# Patient Record
Sex: FEMALE | Race: WHITE | NOT HISPANIC OR LATINO | Employment: OTHER | URBAN - METROPOLITAN AREA
[De-identification: names, ages, dates, MRNs, and addresses within clinical notes are randomized per-mention and may not be internally consistent; named-entity substitution may affect disease eponyms.]

---

## 2023-04-06 DIAGNOSIS — J30.9 ALLERGIC RHINITIS, UNSPECIFIED SEASONALITY, UNSPECIFIED TRIGGER: ICD-10-CM

## 2023-04-06 NOTE — TELEPHONE ENCOUNTER
Pt calling she was seeing an ENT but they have left to another state. She is in need of a Fluticasone nasal spray. She would like to know if JU could please send in for her?    SPENCER Perkins.

## 2023-04-07 RX ORDER — FLUTICASONE PROPIONATE 50 MCG
1 SPRAY, SUSPENSION (ML) NASAL DAILY
COMMUNITY
Start: 2022-12-07 | End: 2023-04-07 | Stop reason: SDUPTHER

## 2023-04-07 RX ORDER — FLUTICASONE PROPIONATE 50 MCG
1 SPRAY, SUSPENSION (ML) NASAL DAILY
Qty: 16 G | Refills: 2 | Status: SHIPPED | OUTPATIENT
Start: 2023-04-07 | End: 2023-12-01

## 2023-05-01 PROBLEM — F41.8 ANXIETY ABOUT HEALTH: Status: ACTIVE | Noted: 2023-05-01

## 2023-05-01 PROBLEM — R93.1 HIGH CORONARY ARTERY CALCIUM SCORE: Status: ACTIVE | Noted: 2023-05-01

## 2023-05-01 PROBLEM — H25.10 NUCLEAR SCLEROSIS: Status: ACTIVE | Noted: 2023-05-01

## 2023-05-01 PROBLEM — H60.509 OTITIS EXTERNA; ACUTE: Status: RESOLVED | Noted: 2023-05-01 | Resolved: 2023-05-01

## 2023-05-01 PROBLEM — R92.30 BREAST DENSITY: Status: ACTIVE | Noted: 2023-05-01

## 2023-05-01 PROBLEM — J34.89 NASAL VESTIBULITIS: Status: ACTIVE | Noted: 2023-05-01

## 2023-05-01 PROBLEM — E55.9 VITAMIN D DEFICIENCY: Status: ACTIVE | Noted: 2023-05-01

## 2023-05-01 PROBLEM — F51.04 CHRONIC INSOMNIA: Status: ACTIVE | Noted: 2023-05-01

## 2023-05-01 PROBLEM — H69.91 UNSPECIFIED EUSTACHIAN TUBE DISORDER, RIGHT EAR: Status: ACTIVE | Noted: 2023-05-01

## 2023-05-01 PROBLEM — J34.3 HYPERTROPHY OF BOTH INFERIOR NASAL TURBINATES: Status: ACTIVE | Noted: 2023-05-01

## 2023-05-01 PROBLEM — E66.9 OBESITY: Status: ACTIVE | Noted: 2023-05-01

## 2023-05-01 PROBLEM — H69.93 DYSFUNCTION OF BOTH EUSTACHIAN TUBES: Status: ACTIVE | Noted: 2023-05-01

## 2023-05-01 PROBLEM — G47.33 OBSTRUCTIVE SLEEP APNEA: Status: ACTIVE | Noted: 2023-05-01

## 2023-05-01 PROBLEM — J30.9 ALLERGIC RHINITIS: Status: ACTIVE | Noted: 2023-05-01

## 2023-05-01 PROBLEM — R63.4 WEIGHT REDUCTION: Status: ACTIVE | Noted: 2023-05-01

## 2023-05-01 PROBLEM — J04.0 ACUTE LARYNGITIS: Status: RESOLVED | Noted: 2023-05-01 | Resolved: 2023-05-01

## 2023-05-01 PROBLEM — E78.01 FAMILIAL HYPERCHOLESTEROLEMIA: Status: ACTIVE | Noted: 2023-05-01

## 2023-05-01 PROBLEM — N95.1 VAGINAL DRYNESS, MENOPAUSAL: Status: ACTIVE | Noted: 2023-05-01

## 2023-05-01 PROBLEM — I10 BENIGN ESSENTIAL HYPERTENSION: Status: ACTIVE | Noted: 2023-05-01

## 2023-05-01 PROBLEM — R49.0 DYSPHONIA: Status: ACTIVE | Noted: 2023-05-01

## 2023-05-01 PROBLEM — E78.41 ELEVATED LIPOPROTEIN(A): Status: ACTIVE | Noted: 2023-05-01

## 2023-05-01 PROBLEM — E87.5 HYPERKALEMIA: Status: ACTIVE | Noted: 2023-05-01

## 2023-05-01 PROBLEM — H90.3 SENSORINEURAL HEARING LOSS, BILATERAL: Status: ACTIVE | Noted: 2023-05-01

## 2023-05-01 PROBLEM — H66.90 ACUTE OTITIS MEDIA: Status: RESOLVED | Noted: 2023-05-01 | Resolved: 2023-05-01

## 2023-05-01 RX ORDER — AMLODIPINE BESYLATE 5 MG/1
5 TABLET ORAL DAILY
COMMUNITY
End: 2023-05-30

## 2023-05-01 RX ORDER — CALCIUM CARBONATE/VITAMIN D3 600MG-5MCG
1 TABLET ORAL DAILY
COMMUNITY
Start: 2015-04-20 | End: 2023-11-06 | Stop reason: ALTCHOICE

## 2023-05-01 RX ORDER — ROSUVASTATIN CALCIUM 40 MG/1
40 TABLET, COATED ORAL DAILY
COMMUNITY
End: 2023-06-27

## 2023-05-01 RX ORDER — RAMIPRIL 10 MG/1
10 CAPSULE ORAL DAILY
COMMUNITY
End: 2023-05-19 | Stop reason: SDUPTHER

## 2023-05-01 RX ORDER — MINERAL OIL
ENEMA (ML) RECTAL
COMMUNITY

## 2023-05-01 RX ORDER — MONTELUKAST SODIUM 10 MG/1
10 TABLET ORAL DAILY
COMMUNITY
End: 2023-05-30

## 2023-05-01 RX ORDER — IBUPROFEN 800 MG/1
TABLET ORAL
COMMUNITY
Start: 2023-03-31 | End: 2023-11-06 | Stop reason: ALTCHOICE

## 2023-05-01 RX ORDER — EZETIMIBE 10 MG/1
10 TABLET ORAL DAILY
COMMUNITY
End: 2023-06-27

## 2023-05-01 RX ORDER — HYDROCODONE BITARTRATE AND ACETAMINOPHEN 5; 325 MG/1; MG/1
1 TABLET ORAL EVERY 4 HOURS PRN
COMMUNITY
Start: 2023-03-27 | End: 2023-05-02 | Stop reason: ALTCHOICE

## 2023-05-01 RX ORDER — SODIUM CHLORIDE 0.65 %
AEROSOL, SPRAY (ML) NASAL
COMMUNITY
Start: 2016-10-24 | End: 2023-11-06 | Stop reason: ALTCHOICE

## 2023-05-01 RX ORDER — ALCLOMETASONE DIPROPIONATE 0.5 MG/G
CREAM TOPICAL 2 TIMES DAILY
COMMUNITY
Start: 2023-03-14

## 2023-05-01 RX ORDER — ZOLPIDEM TARTRATE 5 MG/1
2.5 TABLET ORAL NIGHTLY
COMMUNITY
End: 2023-05-02 | Stop reason: SDUPTHER

## 2023-05-01 RX ORDER — EVOLOCUMAB 140 MG/ML
INJECTION, SOLUTION SUBCUTANEOUS
COMMUNITY
Start: 2019-03-08 | End: 2023-11-06 | Stop reason: ALTCHOICE

## 2023-05-02 ENCOUNTER — OFFICE VISIT (OUTPATIENT)
Dept: PRIMARY CARE | Facility: CLINIC | Age: 68
End: 2023-05-02
Payer: MEDICARE

## 2023-05-02 VITALS
DIASTOLIC BLOOD PRESSURE: 80 MMHG | HEART RATE: 76 BPM | HEIGHT: 64 IN | SYSTOLIC BLOOD PRESSURE: 138 MMHG | WEIGHT: 190.2 LBS | OXYGEN SATURATION: 98 % | BODY MASS INDEX: 32.47 KG/M2

## 2023-05-02 DIAGNOSIS — E78.01 FAMILIAL HYPERCHOLESTEROLEMIA: ICD-10-CM

## 2023-05-02 DIAGNOSIS — F51.04 CHRONIC INSOMNIA: ICD-10-CM

## 2023-05-02 DIAGNOSIS — Z79.899 MEDICATION MANAGEMENT: ICD-10-CM

## 2023-05-02 DIAGNOSIS — I10 BENIGN ESSENTIAL HYPERTENSION: Primary | ICD-10-CM

## 2023-05-02 LAB
AMPHETAMINE (PRESENCE) IN URINE BY SCREEN METHOD: NORMAL
BARBITURATES PRESENCE IN URINE BY SCREEN METHOD: NORMAL
BENZODIAZEPINE (PRESENCE) IN URINE BY SCREEN METHOD: NORMAL
CANNABINOIDS IN URINE BY SCREEN METHOD: NORMAL
COCAINE (PRESENCE) IN URINE BY SCREEN METHOD: NORMAL
DRUG SCREEN COMMENT URINE: NORMAL
FENTANYL URINE: NORMAL
METHADONE (PRESENCE) IN URINE BY SCREEN METHOD: NORMAL
OPIATES (PRESENCE) IN URINE BY SCREEN METHOD: NORMAL
OXYCODONE (PRESENCE) IN URINE BY SCREEN METHOD: NORMAL
PHENCYCLIDINE (PRESENCE) IN URINE BY SCREEN METHOD: NORMAL

## 2023-05-02 PROCEDURE — 3075F SYST BP GE 130 - 139MM HG: CPT | Performed by: FAMILY MEDICINE

## 2023-05-02 PROCEDURE — 3079F DIAST BP 80-89 MM HG: CPT | Performed by: FAMILY MEDICINE

## 2023-05-02 PROCEDURE — 1159F MED LIST DOCD IN RCRD: CPT | Performed by: FAMILY MEDICINE

## 2023-05-02 PROCEDURE — 1036F TOBACCO NON-USER: CPT | Performed by: FAMILY MEDICINE

## 2023-05-02 PROCEDURE — 80368 SEDATIVE HYPNOTICS: CPT

## 2023-05-02 PROCEDURE — 99214 OFFICE O/P EST MOD 30 MIN: CPT | Performed by: FAMILY MEDICINE

## 2023-05-02 PROCEDURE — 80307 DRUG TEST PRSMV CHEM ANLYZR: CPT

## 2023-05-02 RX ORDER — ASPIRIN 81 MG/1
TABLET ORAL
COMMUNITY
Start: 2007-11-12

## 2023-05-02 RX ORDER — ZOLPIDEM TARTRATE 5 MG/1
2.5 TABLET ORAL NIGHTLY
Qty: 45 TABLET | Refills: 0 | Status: SHIPPED | OUTPATIENT
Start: 2023-05-02 | End: 2023-07-14

## 2023-05-02 ASSESSMENT — ENCOUNTER SYMPTOMS
APPETITE CHANGE: 0
UNEXPECTED WEIGHT CHANGE: 0
NAUSEA: 0

## 2023-05-02 NOTE — PROGRESS NOTES
"Subjective   Patient ID: Nallely Neumann is a 67 y.o. female who presents for Follow-up (Needs refill on the Zolpidem/Doing well /Sees Dr Trujillo for sleep medicine has Inspire with great relief./Had a root canal a while back ended up having the tooth pulled. ).    HPI   Patient has hx of stable hypertension, hyperlipidemia.  Pt denies chest pain, shortness of breath and edema.  Patient's current treatment as listed in Rx.  Patient is compliant with treatment and complains of no side effects associated treatment.   Review of Systems   Constitutional:  Negative for appetite change and unexpected weight change.   Eyes:  Negative for visual disturbance.   Gastrointestinal:  Negative for nausea.       Objective   /80   Pulse 76   Ht 1.613 m (5' 3.5\")   Wt 86.3 kg (190 lb 3.2 oz)   SpO2 98%   BMI 33.16 kg/m²     Physical Exam  HENT:      Head: Normocephalic and atraumatic.      Nose: Nose normal.      Mouth/Throat:      Mouth: Mucous membranes are moist.      Pharynx: No oropharyngeal exudate.   Eyes:      Extraocular Movements: Extraocular movements intact.      Conjunctiva/sclera: Conjunctivae normal.      Pupils: Pupils are equal, round, and reactive to light.   Cardiovascular:      Rate and Rhythm: Normal rate and regular rhythm.   Pulmonary:      Effort: Pulmonary effort is normal.   Abdominal:      General: There is no distension.      Palpations: Abdomen is soft.   Musculoskeletal:      Cervical back: Normal range of motion and neck supple.   Lymphadenopathy:      Cervical: No cervical adenopathy.   Neurological:      General: No focal deficit present.      Mental Status: She is alert.   Psychiatric:         Attention and Perception: Attention normal.         Speech: Speech normal.         Behavior: Behavior is cooperative.         Assessment/Plan   Diagnoses and all orders for this visit:  Benign essential hypertension  Comments:  Controlled but a little bit high, suggest to patient that she discussed with " her cardiologist  Orders:  -     Lipid Panel; Future  -     CBC and Auto Differential; Future  -     Comprehensive Metabolic Panel; Future  -     Lipoprotein A (LPA); Future  Medication management  -     zolpidem (Ambien) 5 mg tablet; Take 0.5 tablets (2.5 mg) by mouth once daily at bedtime.  -     Zolpidem Confirmation, Urine; Future  -     Drug Screen, Urine With Reflex to Confirmation; Future  -     OOB Internal Tracking  Chronic insomnia  Comments:  Sleep hygiene  Orders:  -     zolpidem (Ambien) 5 mg tablet; Take 0.5 tablets (2.5 mg) by mouth once daily at bedtime.  -     Zolpidem Confirmation, Urine; Future  -     Drug Screen, Urine With Reflex to Confirmation; Future  -     OOB Internal Tracking  Familial hypercholesterolemia  Comments:  Continue treatment  Orders:  -     Lipid Panel; Future  -     CBC and Auto Differential; Future  -     Comprehensive Metabolic Panel; Future  -     Lipoprotein A (LPA); Future       Recheck 6 months sooner if any problems arise

## 2023-05-04 ENCOUNTER — LAB (OUTPATIENT)
Dept: LAB | Facility: LAB | Age: 68
End: 2023-05-04
Payer: MEDICARE

## 2023-05-04 DIAGNOSIS — E78.01 FAMILIAL HYPERCHOLESTEROLEMIA: ICD-10-CM

## 2023-05-04 DIAGNOSIS — I10 BENIGN ESSENTIAL HYPERTENSION: ICD-10-CM

## 2023-05-04 LAB
ALANINE AMINOTRANSFERASE (SGPT) (U/L) IN SER/PLAS: 25 U/L (ref 7–45)
ALBUMIN (G/DL) IN SER/PLAS: 4.6 G/DL (ref 3.4–5)
ALKALINE PHOSPHATASE (U/L) IN SER/PLAS: 66 U/L (ref 33–136)
ANION GAP IN SER/PLAS: 13 MMOL/L (ref 10–20)
ASPARTATE AMINOTRANSFERASE (SGOT) (U/L) IN SER/PLAS: 22 U/L (ref 9–39)
BASOPHILS (10*3/UL) IN BLOOD BY AUTOMATED COUNT: 0.05 X10E9/L (ref 0–0.1)
BASOPHILS/100 LEUKOCYTES IN BLOOD BY AUTOMATED COUNT: 1 % (ref 0–2)
BILIRUBIN TOTAL (MG/DL) IN SER/PLAS: 0.6 MG/DL (ref 0–1.2)
CALCIUM (MG/DL) IN SER/PLAS: 9.8 MG/DL (ref 8.6–10.3)
CARBON DIOXIDE, TOTAL (MMOL/L) IN SER/PLAS: 30 MMOL/L (ref 21–32)
CHLORIDE (MMOL/L) IN SER/PLAS: 100 MMOL/L (ref 98–107)
CHOLESTEROL (MG/DL) IN SER/PLAS: 140 MG/DL (ref 0–199)
CHOLESTEROL IN HDL (MG/DL) IN SER/PLAS: 85.1 MG/DL
CHOLESTEROL/HDL RATIO: 1.6
CREATININE (MG/DL) IN SER/PLAS: 0.81 MG/DL (ref 0.5–1.05)
EOSINOPHILS (10*3/UL) IN BLOOD BY AUTOMATED COUNT: 0.21 X10E9/L (ref 0–0.7)
EOSINOPHILS/100 LEUKOCYTES IN BLOOD BY AUTOMATED COUNT: 4.4 % (ref 0–6)
ERYTHROCYTE DISTRIBUTION WIDTH (RATIO) BY AUTOMATED COUNT: 12.8 % (ref 11.5–14.5)
ERYTHROCYTE MEAN CORPUSCULAR HEMOGLOBIN CONCENTRATION (G/DL) BY AUTOMATED: 31.3 G/DL (ref 32–36)
ERYTHROCYTE MEAN CORPUSCULAR VOLUME (FL) BY AUTOMATED COUNT: 86 FL (ref 80–100)
ERYTHROCYTES (10*6/UL) IN BLOOD BY AUTOMATED COUNT: 5.41 X10E12/L (ref 4–5.2)
GFR FEMALE: 78 ML/MIN/1.73M2
GLUCOSE (MG/DL) IN SER/PLAS: 97 MG/DL (ref 74–99)
HEMATOCRIT (%) IN BLOOD BY AUTOMATED COUNT: 46.6 % (ref 36–46)
HEMOGLOBIN (G/DL) IN BLOOD: 14.6 G/DL (ref 12–16)
IMMATURE GRANULOCYTES/100 LEUKOCYTES IN BLOOD BY AUTOMATED COUNT: 0.4 % (ref 0–0.9)
LDL: 42 MG/DL (ref 0–99)
LEUKOCYTES (10*3/UL) IN BLOOD BY AUTOMATED COUNT: 4.8 X10E9/L (ref 4.4–11.3)
LYMPHOCYTES (10*3/UL) IN BLOOD BY AUTOMATED COUNT: 1.25 X10E9/L (ref 1.2–4.8)
LYMPHOCYTES/100 LEUKOCYTES IN BLOOD BY AUTOMATED COUNT: 26.2 % (ref 13–44)
MONOCYTES (10*3/UL) IN BLOOD BY AUTOMATED COUNT: 0.47 X10E9/L (ref 0.1–1)
MONOCYTES/100 LEUKOCYTES IN BLOOD BY AUTOMATED COUNT: 9.9 % (ref 2–10)
NEUTROPHILS (10*3/UL) IN BLOOD BY AUTOMATED COUNT: 2.77 X10E9/L (ref 1.2–7.7)
NEUTROPHILS/100 LEUKOCYTES IN BLOOD BY AUTOMATED COUNT: 58.1 % (ref 40–80)
PLATELETS (10*3/UL) IN BLOOD AUTOMATED COUNT: 298 X10E9/L (ref 150–450)
POTASSIUM (MMOL/L) IN SER/PLAS: 4.7 MMOL/L (ref 3.5–5.3)
PROTEIN TOTAL: 6.8 G/DL (ref 6.4–8.2)
SODIUM (MMOL/L) IN SER/PLAS: 138 MMOL/L (ref 136–145)
TRIGLYCERIDE (MG/DL) IN SER/PLAS: 64 MG/DL (ref 0–149)
UREA NITROGEN (MG/DL) IN SER/PLAS: 15 MG/DL (ref 6–23)
VLDL: 13 MG/DL (ref 0–40)

## 2023-05-04 PROCEDURE — 36415 COLL VENOUS BLD VENIPUNCTURE: CPT

## 2023-05-04 PROCEDURE — 83695 ASSAY OF LIPOPROTEIN(A): CPT

## 2023-05-04 PROCEDURE — 85025 COMPLETE CBC W/AUTO DIFF WBC: CPT

## 2023-05-04 PROCEDURE — 80061 LIPID PANEL: CPT

## 2023-05-04 PROCEDURE — 80053 COMPREHEN METABOLIC PANEL: CPT

## 2023-05-05 LAB
ZOLPIDEM METABOLITE (ZCA): 765 NG/ML
ZOLPIDEM: <25 NG/ML

## 2023-05-08 ENCOUNTER — TELEPHONE (OUTPATIENT)
Dept: PRIMARY CARE | Facility: CLINIC | Age: 68
End: 2023-05-08
Payer: MEDICARE

## 2023-05-08 LAB — LIPOPROTEIN A SER/PLAS: 297 MG/DL

## 2023-05-08 NOTE — TELEPHONE ENCOUNTER
Result Communication    Resulted Orders   Lipid Panel   Result Value Ref Range    Cholesterol 140 0 - 199 mg/dL      Comment:      .      AGE      DESIRABLE   BORDERLINE HIGH   HIGH     0-19 Y     0 - 169       170 - 199     >/= 200    20-24 Y     0 - 189       190 - 224     >/= 225         >24 Y     0 - 199       200 - 239     >/= 240   **All ranges are based on fasting samples. Specific   therapeutic targets will vary based on patient-specific   cardiac risk.  .   Pediatric guidelines reference:Pediatrics 2011, 128(S5).   Adult guidelines reference: NCEP ATPIII Guidelines,     MAGDALENO 2001, 258:2486-97  .   Venipuncture immediately after or during the    administration of Metamizole may lead to falsely   low results. Testing should be performed immediately   prior to Metamizole dosing.    HDL 85.1 mg/dL      Comment:      .      AGE      VERY LOW   LOW     NORMAL    HIGH       0-19 Y       < 35   < 40     40-45     ----    20-24 Y       ----   < 40       >45     ----      >24 Y       ----   < 40     40-60      >60  .    Cholesterol/HDL Ratio 1.6       Comment:      REF VALUES  DESIRABLE  < 3.4  HIGH RISK  > 5.0    LDL 42 0 - 99 mg/dL      Comment:      .                           NEAR      BORD      AGE      DESIRABLE  OPTIMAL    HIGH     HIGH     VERY HIGH     0-19 Y     0 - 109     ---    110-129   >/= 130     ----    20-24 Y     0 - 119     ---    120-159   >/= 160     ----      >24 Y     0 -  99   100-129  130-159   160-189     >/=190  .    VLDL 13 0 - 40 mg/dL    Triglycerides 64 0 - 149 mg/dL      Comment:      .      AGE      DESIRABLE   BORDERLINE HIGH   HIGH     VERY HIGH   0 D-90 D    19 - 174         ----         ----        ----  91 D- 9 Y     0 -  74        75 -  99     >/= 100      ----    10-19 Y     0 -  89        90 - 129     >/= 130      ----    20-24 Y     0 - 114       115 - 149     >/= 150      ----         >24 Y     0 - 149       150 - 199    200- 499    >/= 500  .   Venipuncture immediately  after or during the    administration of Metamizole may lead to falsely   low results. Testing should be performed immediately   prior to Metamizole dosing.   CBC and Auto Differential   Result Value Ref Range    WBC 4.8 4.4 - 11.3 x10E9/L    RBC 5.41 (H) 4.00 - 5.20 x10E12/L    Hemoglobin 14.6 12.0 - 16.0 g/dL    Hematocrit 46.6 (H) 36.0 - 46.0 %    MCV 86 80 - 100 fL    MCHC 31.3 (L) 32.0 - 36.0 g/dL    Platelets 298 150 - 450 x10E9/L    RDW 12.8 11.5 - 14.5 %    Neutrophils % 58.1 40.0 - 80.0 %    Immature Granulocytes %, Automated 0.4 0.0 - 0.9 %      Comment:       Immature Granulocyte Count (IG) includes promyelocytes,    myelocytes and metamyelocytes but does not include bands.   Percent differential counts (%) should be interpreted in the   context of the absolute cell counts (cells/L).    Lymphocytes % 26.2 13.0 - 44.0 %    Monocytes % 9.9 2.0 - 10.0 %    Eosinophils % 4.4 0.0 - 6.0 %    Basophils % 1.0 0.0 - 2.0 %    Neutrophils Absolute 2.77 1.20 - 7.70 x10E9/L    Lymphocytes Absolute 1.25 1.20 - 4.80 x10E9/L    Monocytes Absolute 0.47 0.10 - 1.00 x10E9/L    Eosinophils Absolute 0.21 0.00 - 0.70 x10E9/L    Basophils Absolute 0.05 0.00 - 0.10 x10E9/L   Comprehensive Metabolic Panel   Result Value Ref Range    Glucose 97 74 - 99 mg/dL    Sodium 138 136 - 145 mmol/L    Potassium 4.7 3.5 - 5.3 mmol/L    Chloride 100 98 - 107 mmol/L    Bicarbonate 30 21 - 32 mmol/L    Anion Gap 13 10 - 20 mmol/L    Urea Nitrogen 15 6 - 23 mg/dL    Creatinine 0.81 0.50 - 1.05 mg/dL    GFR Female 78 >90 mL/min/1.73m2      Comment:       CALCULATIONS OF ESTIMATED GFR ARE PERFORMED   USING THE 2021 CKD-EPI STUDY REFIT EQUATION   WITHOUT THE RACE VARIABLE FOR THE IDMS-TRACEABLE   CREATININE METHODS.    https://jasn.asnjournals.org/content/early/2021/09/22/ASN.0148530583    Calcium 9.8 8.6 - 10.3 mg/dL    Albumin 4.6 3.4 - 5.0 g/dL    Alkaline Phosphatase 66 33 - 136 U/L    Total Protein 6.8 6.4 - 8.2 g/dL    AST 22 9 - 39 U/L     Total Bilirubin 0.6 0.0 - 1.2 mg/dL    ALT (SGPT) 25 7 - 45 U/L      Comment:       Patients treated with Sulfasalazine may generate    falsely decreased results for ALT.   Lipoprotein A (LPA)   Result Value Ref Range    Lipoprotein (a) 297 (H) <=29 mg/dL      Comment:      Performed By: [facility]  [address] [place] [id number]  : Olayinka Bang MD, PhD       5:17 PM      Results were successfully communicated with the patient and they acknowledged their understanding.

## 2023-05-08 NOTE — TELEPHONE ENCOUNTER
----- Message from Pino Santos MD sent at 5/8/2023  5:03 PM EDT -----  Is the lowest that its ever been  Is still elevated though so continue to follow a low-fat low-cholesterol diet and stay active

## 2023-05-19 ENCOUNTER — TELEPHONE (OUTPATIENT)
Dept: PRIMARY CARE | Facility: CLINIC | Age: 68
End: 2023-05-19
Payer: MEDICARE

## 2023-05-19 DIAGNOSIS — I10 BENIGN ESSENTIAL HYPERTENSION: Primary | ICD-10-CM

## 2023-05-19 RX ORDER — RAMIPRIL 10 MG/1
10 CAPSULE ORAL 2 TIMES DAILY
Qty: 180 CAPSULE | Refills: 0 | Status: SHIPPED | OUTPATIENT
Start: 2023-05-19 | End: 2023-08-29

## 2023-05-19 NOTE — TELEPHONE ENCOUNTER
PT is requesting a call back from Karissa. PT states that the specialist she reached out to is out of the office until the week after memorial day. PT is requesting advice on her BP and if she should try yo see another doctor and/or be proscribed medications.

## 2023-05-30 DIAGNOSIS — J30.1 SEASONAL ALLERGIC RHINITIS DUE TO POLLEN: Primary | ICD-10-CM

## 2023-05-30 DIAGNOSIS — I10 BENIGN ESSENTIAL HYPERTENSION: ICD-10-CM

## 2023-05-30 PROBLEM — E78.00 PURE HYPERCHOLESTEROLEMIA: Status: ACTIVE | Noted: 2023-05-30

## 2023-05-30 PROBLEM — M19.079 ARTHRITIS OF MIDFOOT: Status: ACTIVE | Noted: 2018-11-26

## 2023-05-30 PROBLEM — L40.8 OTHER PSORIASIS: Status: ACTIVE | Noted: 2023-05-30

## 2023-05-30 PROBLEM — M20.12 ACQUIRED HALLUX VALGUS OF LEFT FOOT: Status: ACTIVE | Noted: 2018-11-26

## 2023-05-30 PROBLEM — M20.11 ACQUIRED HALLUX VALGUS OF RIGHT FOOT: Status: ACTIVE | Noted: 2018-11-26

## 2023-05-30 RX ORDER — MONTELUKAST SODIUM 10 MG/1
TABLET ORAL
Qty: 90 TABLET | Refills: 0 | Status: SHIPPED | OUTPATIENT
Start: 2023-05-30 | End: 2023-08-29

## 2023-05-30 RX ORDER — EVOLOCUMAB 140 MG/ML
INJECTION, SOLUTION SUBCUTANEOUS
COMMUNITY
Start: 2019-03-08 | End: 2023-11-06 | Stop reason: ALTCHOICE

## 2023-05-30 RX ORDER — AMLODIPINE BESYLATE 5 MG/1
TABLET ORAL
Qty: 90 TABLET | Refills: 0 | Status: SHIPPED | OUTPATIENT
Start: 2023-05-30 | End: 2023-08-29

## 2023-06-27 DIAGNOSIS — E78.01 FAMILIAL HYPERCHOLESTEROLEMIA: Primary | ICD-10-CM

## 2023-06-27 DIAGNOSIS — E78.01 FAMILIAL HYPERCHOLESTEROLEMIA: ICD-10-CM

## 2023-06-27 RX ORDER — ROSUVASTATIN CALCIUM 40 MG/1
TABLET, COATED ORAL
Qty: 90 TABLET | Refills: 0 | Status: SHIPPED | OUTPATIENT
Start: 2023-06-27 | End: 2023-06-28

## 2023-06-27 RX ORDER — EZETIMIBE 10 MG/1
TABLET ORAL
Qty: 90 TABLET | Refills: 0 | Status: SHIPPED | OUTPATIENT
Start: 2023-06-27 | End: 2023-09-22

## 2023-06-28 DIAGNOSIS — E78.01 FAMILIAL HYPERCHOLESTEROLEMIA: ICD-10-CM

## 2023-06-28 RX ORDER — ROSUVASTATIN CALCIUM 40 MG/1
TABLET, COATED ORAL
Qty: 90 TABLET | Refills: 0 | Status: SHIPPED | OUTPATIENT
Start: 2023-06-28 | End: 2023-06-29

## 2023-06-29 RX ORDER — ROSUVASTATIN CALCIUM 40 MG/1
TABLET, COATED ORAL
Qty: 90 TABLET | Refills: 0 | Status: SHIPPED | OUTPATIENT
Start: 2023-06-29 | End: 2023-12-19

## 2023-07-13 DIAGNOSIS — F51.04 CHRONIC INSOMNIA: ICD-10-CM

## 2023-07-13 DIAGNOSIS — Z79.899 MEDICATION MANAGEMENT: ICD-10-CM

## 2023-07-14 RX ORDER — ZOLPIDEM TARTRATE 5 MG/1
TABLET ORAL
Qty: 45 TABLET | Refills: 0 | Status: SHIPPED | OUTPATIENT
Start: 2023-07-14 | End: 2023-11-01

## 2023-08-29 DIAGNOSIS — I10 BENIGN ESSENTIAL HYPERTENSION: ICD-10-CM

## 2023-08-29 DIAGNOSIS — J30.1 SEASONAL ALLERGIC RHINITIS DUE TO POLLEN: ICD-10-CM

## 2023-08-29 RX ORDER — RAMIPRIL 10 MG/1
10 CAPSULE ORAL 2 TIMES DAILY
Qty: 180 CAPSULE | Refills: 0 | Status: SHIPPED | OUTPATIENT
Start: 2023-08-29 | End: 2023-12-01

## 2023-08-29 RX ORDER — AMLODIPINE BESYLATE 5 MG/1
5 TABLET ORAL DAILY
Qty: 90 TABLET | Refills: 0 | Status: SHIPPED | OUTPATIENT
Start: 2023-08-29 | End: 2023-11-06 | Stop reason: ALTCHOICE

## 2023-08-29 RX ORDER — MONTELUKAST SODIUM 10 MG/1
TABLET ORAL
Qty: 90 TABLET | Refills: 0 | Status: SHIPPED | OUTPATIENT
Start: 2023-08-29 | End: 2023-12-01

## 2023-08-30 RX ORDER — HYDROCODONE BITARTRATE AND ACETAMINOPHEN 5; 325 MG/1; MG/1
1 TABLET ORAL
COMMUNITY
Start: 2023-07-01 | End: 2023-11-06 | Stop reason: ALTCHOICE

## 2023-08-30 RX ORDER — NIRMATRELVIR AND RITONAVIR 300-100 MG
KIT ORAL
COMMUNITY
Start: 2023-08-10 | End: 2024-04-19 | Stop reason: ALTCHOICE

## 2023-08-30 RX ORDER — IBUPROFEN 600 MG/1
600 TABLET ORAL EVERY 6 HOURS
COMMUNITY
Start: 2023-07-01 | End: 2023-11-06 | Stop reason: ALTCHOICE

## 2023-08-30 RX ORDER — CHLORHEXIDINE GLUCONATE ORAL RINSE 1.2 MG/ML
15 SOLUTION DENTAL
COMMUNITY
Start: 2023-07-01 | End: 2023-11-06 | Stop reason: ALTCHOICE

## 2023-09-22 DIAGNOSIS — E78.01 FAMILIAL HYPERCHOLESTEROLEMIA: ICD-10-CM

## 2023-09-22 RX ORDER — AMLODIPINE BESYLATE 10 MG/1
10 TABLET ORAL DAILY
COMMUNITY
Start: 2023-09-13

## 2023-09-22 RX ORDER — EZETIMIBE 10 MG/1
TABLET ORAL
Qty: 90 TABLET | Refills: 0 | Status: SHIPPED | OUTPATIENT
Start: 2023-09-22 | End: 2023-12-19

## 2023-10-06 ENCOUNTER — OFFICE VISIT (OUTPATIENT)
Dept: SLEEP MEDICINE | Facility: CLINIC | Age: 68
End: 2023-10-06
Payer: MEDICARE

## 2023-10-06 VITALS
HEART RATE: 70 BPM | RESPIRATION RATE: 18 BRPM | HEIGHT: 65 IN | SYSTOLIC BLOOD PRESSURE: 143 MMHG | WEIGHT: 189.6 LBS | DIASTOLIC BLOOD PRESSURE: 83 MMHG | TEMPERATURE: 97.7 F | BODY MASS INDEX: 31.59 KG/M2

## 2023-10-06 DIAGNOSIS — I10 BENIGN ESSENTIAL HYPERTENSION: ICD-10-CM

## 2023-10-06 DIAGNOSIS — G47.33 OBSTRUCTIVE SLEEP APNEA: Primary | ICD-10-CM

## 2023-10-06 PROCEDURE — 99214 OFFICE O/P EST MOD 30 MIN: CPT | Performed by: STUDENT IN AN ORGANIZED HEALTH CARE EDUCATION/TRAINING PROGRAM

## 2023-10-06 PROCEDURE — 1159F MED LIST DOCD IN RCRD: CPT | Performed by: STUDENT IN AN ORGANIZED HEALTH CARE EDUCATION/TRAINING PROGRAM

## 2023-10-06 PROCEDURE — 3079F DIAST BP 80-89 MM HG: CPT | Performed by: STUDENT IN AN ORGANIZED HEALTH CARE EDUCATION/TRAINING PROGRAM

## 2023-10-06 PROCEDURE — 1160F RVW MEDS BY RX/DR IN RCRD: CPT | Performed by: STUDENT IN AN ORGANIZED HEALTH CARE EDUCATION/TRAINING PROGRAM

## 2023-10-06 PROCEDURE — 3008F BODY MASS INDEX DOCD: CPT | Performed by: STUDENT IN AN ORGANIZED HEALTH CARE EDUCATION/TRAINING PROGRAM

## 2023-10-06 PROCEDURE — 1036F TOBACCO NON-USER: CPT | Performed by: STUDENT IN AN ORGANIZED HEALTH CARE EDUCATION/TRAINING PROGRAM

## 2023-10-06 PROCEDURE — 1126F AMNT PAIN NOTED NONE PRSNT: CPT | Performed by: STUDENT IN AN ORGANIZED HEALTH CARE EDUCATION/TRAINING PROGRAM

## 2023-10-06 PROCEDURE — 95976 ALYS SMPL CN NPGT PRGRMG: CPT | Performed by: STUDENT IN AN ORGANIZED HEALTH CARE EDUCATION/TRAINING PROGRAM

## 2023-10-06 PROCEDURE — 3077F SYST BP >= 140 MM HG: CPT | Performed by: STUDENT IN AN ORGANIZED HEALTH CARE EDUCATION/TRAINING PROGRAM

## 2023-10-06 ASSESSMENT — ENCOUNTER SYMPTOMS
PSYCHIATRIC NEGATIVE: 1
CARDIOVASCULAR NEGATIVE: 1
NEUROLOGICAL NEGATIVE: 1
RESPIRATORY NEGATIVE: 1
CONSTITUTIONAL NEGATIVE: 1

## 2023-10-06 ASSESSMENT — PAIN SCALES - GENERAL: PAINLEVEL: 0-NO PAIN

## 2023-10-06 NOTE — PROGRESS NOTES
" Patient: Nallely Neumann    [id number]  : 1955 -- AGE 68 y.o.    Provider: Ander Gonzalez MD     Location [facility]   Service Date: 10/5/2023              [facility]  Followup Visit Note    HISTORY OF PRESENT ILLNESS     HISTORY OF PRESENT ILLNESS   Nallely Neumann is a 68 y.o. female with h/o HUMAIRA now on INSPIRE therapy, Obesity, HTN, insomnia who presents to a [facility] for followup.     Assessment and plan from last visit: 2023    66 yo female with h/o HUMAIRA now on INSPIRE therapy, Obesity, HTN, insomnia here for f/u visit     # HUMAIRA  - History of sleep apnea, moderate-severe. She is s/p Inspire implantation in 2016 with Dr. Baez. She was initially followed by Dr. Avila and then by Dr. Gusman. She continues to follow with Dr. Gusman and Dr. Faustin for her insomnia and with me for her HUMAIRA therapy.  - She had two pre op sleep tests- HST in  showing AHI 45 and PSG in 2016 (pt notes \"Study was a nightmare\") that showed AHI ~20.   - When she was activated in 2016, FT was 2.2 at default settings, She eventually stepped up to 3.1 at which she had HST, AHI was 10 on that study   - in  she continued to step up to as high as 3.8V thereafter   - we turned it down to 1.8 - 2.7 (@ 2.1 v: LV 4) at last visit on 2022   - she is now using it about 5 hrs a night, with multiple pauses  today setting:    - outgoing setting at 2.0-2.6 (@ 2.2v); FT was 2.1   - encourage to use it more     # HTN  - BP a bit elevated today @ 138/78  - no headache, blurry vision, chest pain, palpitation, dizziness, syncopal episodes   - continue to f/u with PCP      # Obesity  - current BMI 32.1  - Encouraged continuing healthy weight loss via diet and exercise   - continue to f/u with PCP      RTC in 2 months and will consider ordering a fine-tune study at that time     Current History    On today's visit, the patient reports stepping " up one level since last visit as with 2.2 v, she was still snoring and with 2.3 v, her snoring seemed to have subsided    Usage: Avg 4.5 hrs a night with 4 pauses    Daytime Symptoms    Patient report some daytime symptoms including: none  Patient denies daytime symptoms including: Denies: sleep inertia late to work/school due to sleepiness irritability during the day difficulty with memory or concentration during the day feeling sleepy when driving    Naps:   No.     ESS: 3  NOE 7  FOSQ 38      REVIEW OF SYSTEMS     REVIEW OF SYSTEMS  Review of Systems   Constitutional: Negative.    HENT: Negative.     Respiratory: Negative.     Cardiovascular: Negative.    Genitourinary: Negative.    Skin: Negative.    Neurological: Negative.    Psychiatric/Behavioral: Negative.           ALLERGIES AND MEDICATIONS     ALLERGIES  Allergies   Allergen Reactions    Bee Pollen Unknown    House Dust Unknown    Ragweed Unknown       MEDICATIONS: She has a current medication list which includes the following prescription(s): ezetimibe - TAKE ONE TABLET BY MOUTH DAILY, alclometasone - Apply topically 2 times a day. to affected area, amlodipine - Take 1 tablet (10 mg) by mouth once daily, amlodipine - TAKE ONE TABLET BY MOUTH once DAILY for blood pressure, aspirin - Take by mouth, calcium carbonate-vitamin d3 - Take 1 tablet by mouth once daily, chlorhexidine - Use 15 mL in the mouth or throat.  RINSE MOUTH WITH 15ML (ONE CAPFUL) FOR 30 SECONDS IN THE MORNING AND IN THE EVENING AFTER TOOTHBRUSHING. EXPECTORATE AFTER RINSING DO NOT S, repatha sureclick - Inject 140 mg (1 pen) under the skin Once Every 2 Weeks as directed, evolocumab - INJECT 140 MG (1 PEN) UNDER THE SKIN ONCE EVERY 2 WEEKS AS DIRECTED, fexofenadine - TAKE 1 TABLET DAILY AS NEEDED FOR ALLERGIES by mouth, fluticasone - Administer 1 spray into each nostril once daily, hydrocodone-acetaminophen - Take 1 tablet by mouth.  EVERY 4 TO 6 HOURS AS NEEDED FOR PAIN, ibu - Take 1  "tablet (600 mg) by mouth every 6 hours, ibu - TAKE ONE TABLET BY MOUTH EVERY 6 HOURS FOR PAIN  WHEN NEEDED, montelukast - TAKE ONE TABLET BY MOUTH DAILY, paxlovid, ramipril - TAKE ONE CAPSULE BY MOUTH TWO TIMES A DAY, repatha sureclick - INJECT 140 MG/ML SUBCUTANEOUSLY EVERY TWO (2) WEEKS, rosuvastatin - TAKE ONE TABLET BY MOUTH ONCE DAILY, ocean nasal - 2 spray(s) nasal 3 to 4 times a day, and zolpidem - TAKE 1/2 TABLET (2.5 MG) BY MOUTH ONCE DAILY AT BEDTIME.    PAST MEDICAL HISTORY : She  has a past medical history of Acute laryngitis (05/01/2023), Dorsalgia, unspecified, Insomnia, unspecified, Otitis externa; acute (05/01/2023), Parasomnia, unspecified (11/02/2017), Personal history of other diseases of the circulatory system (06/23/2016), Personal history of other diseases of the nervous system and sense organs, Pure hypercholesterolemia, unspecified (06/22/2016), and Pure hyperglyceridemia.    PAST SURGICAL HISTORY: She  has a past surgical history that includes Other surgical history (07/06/2017); Other surgical history (06/19/2020); Incisional breast biopsy (12/02/2015); and Other surgical history (04/05/2016).     FAMILY HISTORY: No changes since previous visit. Otherwise non-contributory as charted.       SOCIAL HISTORY  She  reports that she has never smoked. She has never used smokeless tobacco. She reports current alcohol use of about 7.0 standard drinks of alcohol per week. She reports that she does not use drugs.       PHYSICAL EXAM     VITAL SIGNS: There were no vitals taken for this visit.     PREVIOUS WEIGHTS:  Wt Readings from Last 3 Encounters:   05/02/23 86.3 kg (190 lb 3.2 oz)   12/02/22 85.7 kg (189 lb)   10/27/22 86.1 kg (189 lb 14.4 oz)       RESULTS/DATA     No results found for: \"IRON\", \"TRANSFERRIN\", \"IRONSAT\", \"TIBC\", \"FERRITIN\"      ASSESSMENT/PLAN     Ms. Neumann is a 68 y.o. female and She returns in followup to the [facility] for their HUMAIRA and inspire " "management.    Problem List and Orders  Problem List Items Addressed This Visit             ICD-10-CM    Benign essential hypertension I10     BP Readings from Last 1 Encounters:   10/06/23 143/83   - doing well, asymptomatic  - discussed at length the impact of untreated HUMAIRA and BP control  - continue current management and follow-up with PCP          Obstructive sleep apnea - Primary G47.33     - History of sleep apnea, moderate-severe. She is s/p Inspire implantation in February 2016 with Dr. Baez. She was initially followed by Dr. Avila and then by Dr. Gusman. She continues to follow with Dr. Gusman and Dr. Faustin for her insomnia and with me for her HUMAIRA therapy.  - She had two pre op sleep tests- HST in 2014 showing AHI 45 and PSG in 2016 Jan (pt notes \"Study was a nightmare\") that showed AHI ~20.   - When she was activated in April 2016, FT was 2.2 at default settings, She eventually stepped up to 3.1 at which she had HST, AHI was 10 on that study   - in 2022 she continued to step up to as high as 3.8V thereafter   - we turned it down to 1.8 - 2.7 (@ 2.1 v: LV 4) at last visit on 12/2022   - she is now using it about 4.5 hrs a night, with multiple pauses  today setting:    - outgoing setting at 2.0-2.5 (@ 2.3v)  - pause time changed to 15 min   - encourage to use it more         BMI 31.0-31.9,adult Z68.31     - current BMI at ~   BMI Readings from Last 1 Encounters:   10/06/23 31.55 kg/m²   - encouraged healthy weight loss via diet and exercise  - consider referral to the weight loss program at follow-up visit              Recommendations/Plan:    RTC in 6 months         "

## 2023-10-07 NOTE — ASSESSMENT & PLAN NOTE
- current BMI at ~   BMI Readings from Last 1 Encounters:   10/06/23 31.55 kg/m²     - encouraged healthy weight loss via diet and exercise  - consider referral to the weight loss program at follow-up visit

## 2023-10-07 NOTE — ASSESSMENT & PLAN NOTE
"- History of sleep apnea, moderate-severe. She is s/p Inspire implantation in February 2016 with Dr. Baez. She was initially followed by Dr. Avila and then by Dr. Gusman. She continues to follow with Dr. Gusman and Dr. Faustin for her insomnia and with me for her HUMAIRA therapy.  - She had two pre op sleep tests- HST in 2014 showing AHI 45 and PSG in 2016 Jan (pt notes \"Study was a nightmare\") that showed AHI ~20.   - When she was activated in April 2016, FT was 2.2 at default settings, She eventually stepped up to 3.1 at which she had HST, AHI was 10 on that study   - in 2022 she continued to step up to as high as 3.8V thereafter   - we turned it down to 1.8 - 2.7 (@ 2.1 v: LV 4) at last visit on 12/2022   - she is now using it about 4.5 hrs a night, with multiple pauses  today setting:    - outgoing setting at 2.0-2.5 (@ 2.3v)  - pause time changed to 15 min   - encourage to use it more  "

## 2023-10-07 NOTE — ASSESSMENT & PLAN NOTE
BP Readings from Last 1 Encounters:   10/06/23 143/83     - doing well, asymptomatic  - discussed at length the impact of untreated HUMAIRA and BP control  - continue current management and follow-up with PCP

## 2023-10-24 ENCOUNTER — SPECIALTY PHARMACY (OUTPATIENT)
Dept: PHARMACY | Facility: CLINIC | Age: 68
End: 2023-10-24

## 2023-10-24 ENCOUNTER — PHARMACY VISIT (OUTPATIENT)
Dept: PHARMACY | Facility: CLINIC | Age: 68
End: 2023-10-24
Payer: MEDICARE

## 2023-10-24 DIAGNOSIS — E78.41 ELEVATED LIPOPROTEIN(A): ICD-10-CM

## 2023-10-24 DIAGNOSIS — E78.01 FAMILIAL HYPERCHOLESTEROLEMIA: Primary | ICD-10-CM

## 2023-10-24 PROCEDURE — RXMED WILLOW AMBULATORY MEDICATION CHARGE

## 2023-10-24 RX ORDER — EVOLOCUMAB 140 MG/ML
INJECTION, SOLUTION SUBCUTANEOUS
Qty: 6 ML | Refills: 3 | Status: SHIPPED | OUTPATIENT
Start: 2023-10-24 | End: 2024-03-08 | Stop reason: SDUPTHER

## 2023-10-30 ENCOUNTER — TELEPHONE (OUTPATIENT)
Dept: CARDIOLOGY | Facility: CLINIC | Age: 68
End: 2023-10-30
Payer: MEDICARE

## 2023-10-30 NOTE — TELEPHONE ENCOUNTER
Spoke with the patient.  Reviewed blood pressure reports.  Blood pressures have been under control with up titration of both ramipril and amlodipine.  No significant swelling.  Continue current regimen.    ----- Message from Marilee Anderson RN sent at 10/30/2023  9:09 AM EDT -----  Regarding: FW: Blood pressure results  Contact: [phone]    ----- Message -----  From: Nallely Neumann  Sent: 10/30/2023   7:06 AM EDT  To: [facility] [id number] Clinical Support Staff  Subject: Blood pressure results                           Good morning Dr. Poe,  As a follow up to my appointment with you in September, I have been taking my blood pressure a few times per week. Below are the results:  111/74 - 9/14  113/72- 9/16  118/67- 9/20  117/68 - 9/22  114/70 - 9/25  116/72 - 10/1  119/71-10/6  101/64-10/8  117/73-10/10  119/75-10/12  115/72-10/13  118/72-10/16  117/66-10/19  119/71-10/21  114/69-10/23  101/63-10/27  112/71-10/30    I look forward to your assessment of these results.  If you need to speak to me, you can reach me at [phone].  Nallely Neumann

## 2023-11-01 DIAGNOSIS — F51.04 CHRONIC INSOMNIA: ICD-10-CM

## 2023-11-01 DIAGNOSIS — Z79.899 MEDICATION MANAGEMENT: ICD-10-CM

## 2023-11-01 RX ORDER — ZOLPIDEM TARTRATE 5 MG/1
TABLET ORAL
Qty: 45 TABLET | Refills: 0 | Status: SHIPPED | OUTPATIENT
Start: 2023-11-01 | End: 2024-04-29

## 2023-11-05 ASSESSMENT — ENCOUNTER SYMPTOMS
PND: 0
PALPITATIONS: 0
HEADACHES: 0
ORTHOPNEA: 0
HYPERTENSION: 1
SWEATS: 0
NECK PAIN: 0
SHORTNESS OF BREATH: 0
BLURRED VISION: 0

## 2023-11-06 ENCOUNTER — OFFICE VISIT (OUTPATIENT)
Dept: PRIMARY CARE | Facility: CLINIC | Age: 68
End: 2023-11-06
Payer: MEDICARE

## 2023-11-06 VITALS
BODY MASS INDEX: 31.52 KG/M2 | SYSTOLIC BLOOD PRESSURE: 124 MMHG | HEART RATE: 74 BPM | OXYGEN SATURATION: 97 % | HEIGHT: 65 IN | DIASTOLIC BLOOD PRESSURE: 80 MMHG | WEIGHT: 189.2 LBS

## 2023-11-06 DIAGNOSIS — Z00.00 ROUTINE GENERAL MEDICAL EXAMINATION AT HEALTH CARE FACILITY: Primary | ICD-10-CM

## 2023-11-06 DIAGNOSIS — I10 BENIGN ESSENTIAL HYPERTENSION: ICD-10-CM

## 2023-11-06 DIAGNOSIS — E78.01 FAMILIAL HYPERCHOLESTEROLEMIA: ICD-10-CM

## 2023-11-06 PROCEDURE — 3008F BODY MASS INDEX DOCD: CPT | Performed by: FAMILY MEDICINE

## 2023-11-06 PROCEDURE — 1126F AMNT PAIN NOTED NONE PRSNT: CPT | Performed by: FAMILY MEDICINE

## 2023-11-06 PROCEDURE — 1036F TOBACCO NON-USER: CPT | Performed by: FAMILY MEDICINE

## 2023-11-06 PROCEDURE — 99214 OFFICE O/P EST MOD 30 MIN: CPT | Performed by: FAMILY MEDICINE

## 2023-11-06 PROCEDURE — 3079F DIAST BP 80-89 MM HG: CPT | Performed by: FAMILY MEDICINE

## 2023-11-06 PROCEDURE — 1170F FXNL STATUS ASSESSED: CPT | Performed by: FAMILY MEDICINE

## 2023-11-06 PROCEDURE — 3074F SYST BP LT 130 MM HG: CPT | Performed by: FAMILY MEDICINE

## 2023-11-06 PROCEDURE — 1160F RVW MEDS BY RX/DR IN RCRD: CPT | Performed by: FAMILY MEDICINE

## 2023-11-06 PROCEDURE — 1159F MED LIST DOCD IN RCRD: CPT | Performed by: FAMILY MEDICINE

## 2023-11-06 PROCEDURE — G0439 PPPS, SUBSEQ VISIT: HCPCS | Performed by: FAMILY MEDICINE

## 2023-11-06 RX ORDER — ADHESIVE BANDAGE 7/8"
BANDAGE TOPICAL
COMMUNITY
Start: 2015-01-01

## 2023-11-06 ASSESSMENT — ENCOUNTER SYMPTOMS
PALPITATIONS: 0
LOSS OF SENSATION IN FEET: 0
ORTHOPNEA: 0
HYPERTENSION: 1
OCCASIONAL FEELINGS OF UNSTEADINESS: 0
SHORTNESS OF BREATH: 0
PND: 0
HEADACHES: 0
SWEATS: 0
NECK PAIN: 0
BLURRED VISION: 0
DEPRESSION: 0

## 2023-11-06 ASSESSMENT — PATIENT HEALTH QUESTIONNAIRE - PHQ9
2. FEELING DOWN, DEPRESSED OR HOPELESS: NOT AT ALL
SUM OF ALL RESPONSES TO PHQ9 QUESTIONS 1 AND 2: 0
1. LITTLE INTEREST OR PLEASURE IN DOING THINGS: NOT AT ALL

## 2023-11-06 ASSESSMENT — COLUMBIA-SUICIDE SEVERITY RATING SCALE - C-SSRS
1. IN THE PAST MONTH, HAVE YOU WISHED YOU WERE DEAD OR WISHED YOU COULD GO TO SLEEP AND NOT WAKE UP?: NO
2. HAVE YOU ACTUALLY HAD ANY THOUGHTS OF KILLING YOURSELF?: NO
6. HAVE YOU EVER DONE ANYTHING, STARTED TO DO ANYTHING, OR PREPARED TO DO ANYTHING TO END YOUR LIFE?: NO

## 2023-11-06 ASSESSMENT — ACTIVITIES OF DAILY LIVING (ADL)
MANAGING_FINANCES: INDEPENDENT
BATHING: INDEPENDENT
DRESSING: INDEPENDENT
DOING_HOUSEWORK: INDEPENDENT
GROCERY_SHOPPING: INDEPENDENT
TAKING_MEDICATION: INDEPENDENT

## 2023-11-06 NOTE — PROGRESS NOTES
"Subjective   Patient ID: Nallely Neumann is a 68 y.o. female who presents for Follow-up (6 month ) and Medicare Annual Wellness Visit Initial.    Hypertension  This is a chronic problem. The current episode started more than 1 year ago. The problem has been gradually improving since onset. The problem is resistant. Pertinent negatives include no anxiety, blurred vision, chest pain, headaches, malaise/fatigue, neck pain, orthopnea, palpitations, peripheral edema, PND, shortness of breath or sweats. There are no associated agents to hypertension. Risk factors for coronary artery disease include dyslipidemia, family history, obesity and post-menopausal state. There are no compliance problems.         Review of Systems   Constitutional:  Negative for malaise/fatigue.   Eyes:  Negative for blurred vision.   Respiratory:  Negative for shortness of breath.    Cardiovascular:  Negative for chest pain, palpitations, orthopnea and PND.   Musculoskeletal:  Negative for neck pain.   Neurological:  Negative for headaches.       Objective   /80   Pulse 74   Ht 1.651 m (5' 5\")   Wt 85.8 kg (189 lb 3.2 oz)   SpO2 97%   BMI 31.48 kg/m²     Physical Exam  HENT:      Head: Normocephalic and atraumatic.      Nose: Nose normal.      Mouth/Throat:      Mouth: Mucous membranes are moist.      Pharynx: No oropharyngeal exudate.   Eyes:      Extraocular Movements: Extraocular movements intact.      Conjunctiva/sclera: Conjunctivae normal.      Pupils: Pupils are equal, round, and reactive to light.   Cardiovascular:      Rate and Rhythm: Normal rate and regular rhythm.   Pulmonary:      Effort: Pulmonary effort is normal.   Abdominal:      General: There is no distension.      Palpations: Abdomen is soft.   Musculoskeletal:      Cervical back: Normal range of motion and neck supple.   Lymphadenopathy:      Cervical: No cervical adenopathy.   Neurological:      General: No focal deficit present.      Mental Status: She is alert. "   Psychiatric:         Attention and Perception: Attention normal.         Speech: Speech normal.         Behavior: Behavior is cooperative.         Assessment/Plan   Diagnoses and all orders for this visit:  Routine general medical examination at health care facility  Comments:   DELIA discussed  Benign essential hypertension  Comments:  Treated and controlled  Amlodipine up to 10 mg daily now  Orders:  -     CBC and Auto Differential; Future  -     Basic metabolic panel; Future  Familial hypercholesterolemia  Comments:  Treated and controlled with good reduction in LP(a)  Orders:  -     Lipid panel; Future    Reviewed labs  Recheck 6 months sooner if any issues arise

## 2023-11-07 ENCOUNTER — LAB (OUTPATIENT)
Dept: LAB | Facility: LAB | Age: 68
End: 2023-11-07
Payer: MEDICARE

## 2023-11-07 DIAGNOSIS — I10 BENIGN ESSENTIAL HYPERTENSION: ICD-10-CM

## 2023-11-07 DIAGNOSIS — E78.01 FAMILIAL HYPERCHOLESTEROLEMIA: ICD-10-CM

## 2023-11-07 LAB
ANION GAP SERPL CALC-SCNC: 13 MMOL/L (ref 10–20)
BASOPHILS # BLD AUTO: 0.06 X10*3/UL (ref 0–0.1)
BASOPHILS NFR BLD AUTO: 1.2 %
BUN SERPL-MCNC: 15 MG/DL (ref 6–23)
CALCIUM SERPL-MCNC: 9.7 MG/DL (ref 8.6–10.3)
CHLORIDE SERPL-SCNC: 100 MMOL/L (ref 98–107)
CHOLEST SERPL-MCNC: 142 MG/DL (ref 0–199)
CHOLESTEROL/HDL RATIO: 1.5
CO2 SERPL-SCNC: 29 MMOL/L (ref 21–32)
CREAT SERPL-MCNC: 0.75 MG/DL (ref 0.5–1.05)
EOSINOPHIL # BLD AUTO: 0.24 X10*3/UL (ref 0–0.7)
EOSINOPHIL NFR BLD AUTO: 4.8 %
ERYTHROCYTE [DISTWIDTH] IN BLOOD BY AUTOMATED COUNT: 12.7 % (ref 11.5–14.5)
GFR SERPL CREATININE-BSD FRML MDRD: 86 ML/MIN/1.73M*2
GLUCOSE SERPL-MCNC: 91 MG/DL (ref 74–99)
HCT VFR BLD AUTO: 45.4 % (ref 36–46)
HDLC SERPL-MCNC: 97 MG/DL
HGB BLD-MCNC: 14.4 G/DL (ref 12–16)
IMM GRANULOCYTES # BLD AUTO: 0.02 X10*3/UL (ref 0–0.7)
IMM GRANULOCYTES NFR BLD AUTO: 0.4 % (ref 0–0.9)
LDLC SERPL CALC-MCNC: 35 MG/DL
LYMPHOCYTES # BLD AUTO: 1.22 X10*3/UL (ref 1.2–4.8)
LYMPHOCYTES NFR BLD AUTO: 24.6 %
MCH RBC QN AUTO: 27 PG (ref 26–34)
MCHC RBC AUTO-ENTMCNC: 31.7 G/DL (ref 32–36)
MCV RBC AUTO: 85 FL (ref 80–100)
MONOCYTES # BLD AUTO: 0.43 X10*3/UL (ref 0.1–1)
MONOCYTES NFR BLD AUTO: 8.7 %
NEUTROPHILS # BLD AUTO: 2.98 X10*3/UL (ref 1.2–7.7)
NEUTROPHILS NFR BLD AUTO: 60.3 %
NON HDL CHOLESTEROL: 45 MG/DL (ref 0–149)
NRBC BLD-RTO: 0 /100 WBCS (ref 0–0)
PLATELET # BLD AUTO: 361 X10*3/UL (ref 150–450)
POTASSIUM SERPL-SCNC: 4.4 MMOL/L (ref 3.5–5.3)
RBC # BLD AUTO: 5.34 X10*6/UL (ref 4–5.2)
SODIUM SERPL-SCNC: 138 MMOL/L (ref 136–145)
TRIGL SERPL-MCNC: 48 MG/DL (ref 0–149)
VLDL: 10 MG/DL (ref 0–40)
WBC # BLD AUTO: 5 X10*3/UL (ref 4.4–11.3)

## 2023-11-07 PROCEDURE — 80061 LIPID PANEL: CPT

## 2023-11-07 PROCEDURE — 85025 COMPLETE CBC W/AUTO DIFF WBC: CPT

## 2023-11-07 PROCEDURE — 80048 BASIC METABOLIC PNL TOTAL CA: CPT

## 2023-11-07 PROCEDURE — 36415 COLL VENOUS BLD VENIPUNCTURE: CPT

## 2023-11-08 ENCOUNTER — TELEPHONE (OUTPATIENT)
Dept: PRIMARY CARE | Facility: CLINIC | Age: 68
End: 2023-11-08
Payer: MEDICARE

## 2023-11-08 NOTE — TELEPHONE ENCOUNTER
Result Communication    Resulted Orders   CBC and Auto Differential   Result Value Ref Range    WBC 5.0 4.4 - 11.3 x10*3/uL    nRBC 0.0 0.0 - 0.0 /100 WBCs    RBC 5.34 (H) 4.00 - 5.20 x10*6/uL    Hemoglobin 14.4 12.0 - 16.0 g/dL    Hematocrit 45.4 36.0 - 46.0 %    MCV 85 80 - 100 fL    MCH 27.0 26.0 - 34.0 pg    MCHC 31.7 (L) 32.0 - 36.0 g/dL    RDW 12.7 11.5 - 14.5 %    Platelets 361 150 - 450 x10*3/uL    Neutrophils % 60.3 40.0 - 80.0 %    Immature Granulocytes %, Automated 0.4 0.0 - 0.9 %      Comment:      Immature Granulocyte Count (IG) includes promyelocytes, myelocytes and metamyelocytes but does not include bands. Percent differential counts (%) should be interpreted in the context of the absolute cell counts (cells/UL).    Lymphocytes % 24.6 13.0 - 44.0 %    Monocytes % 8.7 2.0 - 10.0 %    Eosinophils % 4.8 0.0 - 6.0 %    Basophils % 1.2 0.0 - 2.0 %    Neutrophils Absolute 2.98 1.20 - 7.70 x10*3/uL      Comment:      Percent differential counts (%) should be interpreted in the context of the absolute cell counts (cells/uL).    Immature Granulocytes Absolute, Automated 0.02 0.00 - 0.70 x10*3/uL    Lymphocytes Absolute 1.22 1.20 - 4.80 x10*3/uL    Monocytes Absolute 0.43 0.10 - 1.00 x10*3/uL    Eosinophils Absolute 0.24 0.00 - 0.70 x10*3/uL    Basophils Absolute 0.06 0.00 - 0.10 x10*3/uL   Basic metabolic panel   Result Value Ref Range    Glucose 91 74 - 99 mg/dL    Sodium 138 136 - 145 mmol/L    Potassium 4.4 3.5 - 5.3 mmol/L    Chloride 100 98 - 107 mmol/L    Bicarbonate 29 21 - 32 mmol/L    Anion Gap 13 10 - 20 mmol/L    Urea Nitrogen 15 6 - 23 mg/dL    Creatinine 0.75 0.50 - 1.05 mg/dL    eGFR 86 >60 mL/min/1.73m*2      Comment:      Calculations of estimated GFR are performed using the 2021 CKD-EPI Study Refit equation without the race variable for the IDMS-Traceable creatinine methods.  https://jasn.asnjournals.org/content/early/2021/09/22/ASN.0135964853    Calcium 9.7 8.6 - 10.3 mg/dL   Lipid panel    Result Value Ref Range    Cholesterol 142 0 - 199 mg/dL      Comment:            Age      Desirable   Borderline High   High     0-19 Y     0 - 169       170 - 199     >/= 200    20-24 Y     0 - 189       190 - 224     >/= 225         >24 Y     0 - 199       200 - 239     >/= 240   **All ranges are based on fasting samples. Specific   therapeutic targets will vary based on patient-specific   cardiac risk.    Pediatric guidelines reference:Pediatrics 2011, 128(S5).Adult guidelines reference: NCEP ATPIII Guidelines,MAGDALENO 2001, 258:2486-97    Venipuncture immediately after or during the administration of Metamizole may lead to falsely low results. Testing should be performed immediately prior to Metamizole dosing.    HDL-Cholesterol 97.0 mg/dL      Comment:        Age       Very Low   Low     Normal    High    0-19 Y    < 35      < 40     40-45     ----  20-24 Y    ----     < 40      >45      ----        >24 Y      ----     < 40     40-60      >60      Cholesterol/HDL Ratio 1.5       Comment:        Ref Values  Desirable  < 3.4  High Risk  > 5.0    LDL Calculated 35 <=99 mg/dL      Comment:                                  Near   Borderline      AGE      Desirable  Optimal    High     High     Very High     0-19 Y     0 - 109     ---    110-129   >/= 130     ----    20-24 Y     0 - 119     ---    120-159   >/= 160     ----      >24 Y     0 -  99   100-129  130-159   160-189     >/=190      VLDL 10 0 - 40 mg/dL    Triglycerides 48 0 - 149 mg/dL      Comment:         Age         Desirable   Borderline High   High     Very High   0 D-90 D    19 - 174         ----         ----        ----  91 D- 9 Y     0 -  74        75 -  99     >/= 100      ----    10-19 Y     0 -  89        90 - 129     >/= 130      ----    20-24 Y     0 - 114       115 - 149     >/= 150      ----         >24 Y     0 - 149       150 - 199    200- 499    >/= 500    Venipuncture immediately after or during the administration of Metamizole may lead to  falsely low results. Testing should be performed immediately prior to Metamizole dosing.    Non HDL Cholesterol 45 0 - 149 mg/dL      Comment:            Age       Desirable   Borderline High   High     Very High     0-19 Y     0 - 119       120 - 144     >/= 145    >/= 160    20-24 Y     0 - 149       150 - 189     >/= 190      ----         >24 Y    30 mg/dL above LDL Cholesterol goal         12:21 PM      Results were successfully communicated with the patient and they acknowledged their understanding.

## 2023-11-09 ENCOUNTER — SPECIALTY PHARMACY (OUTPATIENT)
Dept: PHARMACY | Facility: CLINIC | Age: 68
End: 2023-11-09

## 2023-11-17 ENCOUNTER — SPECIALTY PHARMACY (OUTPATIENT)
Dept: PHARMACY | Facility: CLINIC | Age: 68
End: 2023-11-17

## 2023-11-17 ENCOUNTER — PHARMACY VISIT (OUTPATIENT)
Dept: PHARMACY | Facility: CLINIC | Age: 68
End: 2023-11-17
Payer: MEDICARE

## 2023-11-17 PROCEDURE — RXMED WILLOW AMBULATORY MEDICATION CHARGE

## 2023-11-29 ASSESSMENT — ENCOUNTER SYMPTOMS
PSYCHIATRIC NEGATIVE: 1
EYES NEGATIVE: 1
CARDIOVASCULAR NEGATIVE: 1
SHORTNESS OF BREATH: 0
NEUROLOGICAL NEGATIVE: 1
NECK PAIN: 0
RESPIRATORY NEGATIVE: 1
MUSCULOSKELETAL NEGATIVE: 1
ENDOCRINE NEGATIVE: 1
PALPITATIONS: 0
HEADACHES: 0
ORTHOPNEA: 0
CONSTITUTIONAL NEGATIVE: 1
HYPERTENSION: 1
PND: 0
SWEATS: 0
GASTROINTESTINAL NEGATIVE: 1
BLURRED VISION: 0
HEMATOLOGIC/LYMPHATIC NEGATIVE: 1

## 2023-11-30 ENCOUNTER — APPOINTMENT (OUTPATIENT)
Dept: OBSTETRICS AND GYNECOLOGY | Facility: CLINIC | Age: 68
End: 2023-11-30
Payer: MEDICARE

## 2023-11-30 ENCOUNTER — OFFICE VISIT (OUTPATIENT)
Dept: OBSTETRICS AND GYNECOLOGY | Facility: CLINIC | Age: 68
End: 2023-11-30
Payer: MEDICARE

## 2023-11-30 VITALS
HEIGHT: 64 IN | BODY MASS INDEX: 31.92 KG/M2 | SYSTOLIC BLOOD PRESSURE: 130 MMHG | DIASTOLIC BLOOD PRESSURE: 80 MMHG | WEIGHT: 187 LBS

## 2023-11-30 DIAGNOSIS — Z12.31 ENCOUNTER FOR SCREENING MAMMOGRAM FOR BREAST CANCER: ICD-10-CM

## 2023-11-30 DIAGNOSIS — R92.30 DENSE BREAST TISSUE: Primary | ICD-10-CM

## 2023-11-30 PROCEDURE — 99213 OFFICE O/P EST LOW 20 MIN: CPT | Performed by: OBSTETRICS & GYNECOLOGY

## 2023-11-30 PROCEDURE — 1126F AMNT PAIN NOTED NONE PRSNT: CPT | Performed by: OBSTETRICS & GYNECOLOGY

## 2023-11-30 PROCEDURE — 3075F SYST BP GE 130 - 139MM HG: CPT | Performed by: OBSTETRICS & GYNECOLOGY

## 2023-11-30 PROCEDURE — 1160F RVW MEDS BY RX/DR IN RCRD: CPT | Performed by: OBSTETRICS & GYNECOLOGY

## 2023-11-30 PROCEDURE — 3079F DIAST BP 80-89 MM HG: CPT | Performed by: OBSTETRICS & GYNECOLOGY

## 2023-11-30 PROCEDURE — 1159F MED LIST DOCD IN RCRD: CPT | Performed by: OBSTETRICS & GYNECOLOGY

## 2023-11-30 PROCEDURE — 3008F BODY MASS INDEX DOCD: CPT | Performed by: OBSTETRICS & GYNECOLOGY

## 2023-11-30 PROCEDURE — 1036F TOBACCO NON-USER: CPT | Performed by: OBSTETRICS & GYNECOLOGY

## 2023-11-30 NOTE — PROGRESS NOTES
"PAP 6-14-19 NEG HPV NEG  MAMMO 2-16-23  DEXA 10-31-18 Normal T=-0.5  COLON 2-13-23    SUBJECTIVE    67yo for breast and GYN exam.  Last visit 10/2022  Dense breasts on imaging. Last mammogram 2/2023  Denies any major intervening medical or surgical issues.   postmenopausal since age 51. No bleeding.  Still dealing with sleep issues. - implantable device Inspire, working well. Continues to see Dr. Quinonez.   Normal colonoscopy last year.  , nonsmoker, 1 ETOH / day.    Review of Systems   Constitutional: Negative.    HENT: Negative.     Eyes: Negative.    Respiratory: Negative.  Negative for shortness of breath.    Cardiovascular: Negative.  Negative for chest pain and palpitations.   Gastrointestinal: Negative.    Endocrine: Negative.    Genitourinary: Negative.    Musculoskeletal: Negative.  Negative for neck pain.   Skin: Negative.    Neurological: Negative.  Negative for headaches.   Hematological: Negative.    Psychiatric/Behavioral: Negative.     All other systems reviewed and are negative.      OBJECTIVE    /80   Ht 1.626 m (5' 4\")   Wt 84.8 kg (187 lb)   BMI 32.10 kg/m²      Physical Exam     Constitutional: Alert and in no acute distress. Well developed, well nourished   Head and Face: Head and face: normal   Eyes: Normal external exam - nonicteric sclera, extraocular movements intact (EOMI) and no ptosis.   Ears, Nose, Mouth, and Throat: External inspection of ears and nose: normal   Neck: no neck asymmetry. Supple and thyroid not enlarged and there were no palpable thyroid nodules   Cardiovascular: Heart rate and rhythm were normal, normal S1 and S2, no gallops, and no murmurs   Pulmonary: No respiratory distress and clear bilateral breath sounds   Chest: Breasts: normal appearance, no nipple discharge and no skin changes and palpation of breasts and axillae: no palpable mass and no axillary lymphadenopathy   Abdomen: soft nontender; no abdominal mass palpated, no organomegaly and no hernias "   Genitourinary: external genitalia: normal, no inguinal lymphadenopathy, Bartholin's urethral and Clawson's glands: normal, urethra: normal, bladder: normal on palpation and perianal area: normal   Vagina: normal.   Cervix: Normal appearing without lesions.  Uterus: Normal, mobile, nontender.  Right Adnexa/parametria: Normal.   Left Adnexa/parametria: Normal.   Skin: normal skin color and pigmentation, normal skin turgor, and no rash.   Psychiatric: alert and oriented x 3., affect normal to patient baseline and mood: appropriate      ASSESSMENT/PLAN  Normal breast and GYN exam.  Dense breasts on imaging.  Order for routine screening mammogram.     Destiny Lake MD

## 2023-12-01 DIAGNOSIS — J30.9 ALLERGIC RHINITIS, UNSPECIFIED SEASONALITY, UNSPECIFIED TRIGGER: ICD-10-CM

## 2023-12-01 DIAGNOSIS — I10 BENIGN ESSENTIAL HYPERTENSION: ICD-10-CM

## 2023-12-01 DIAGNOSIS — J30.1 SEASONAL ALLERGIC RHINITIS DUE TO POLLEN: ICD-10-CM

## 2023-12-01 RX ORDER — FLUTICASONE PROPIONATE 50 MCG
1 SPRAY, SUSPENSION (ML) NASAL DAILY
Qty: 16 G | Refills: 0 | Status: SHIPPED | OUTPATIENT
Start: 2023-12-01 | End: 2024-02-26 | Stop reason: SDUPTHER

## 2023-12-01 RX ORDER — RAMIPRIL 10 MG/1
10 CAPSULE ORAL 2 TIMES DAILY
Qty: 180 CAPSULE | Refills: 0 | Status: SHIPPED | OUTPATIENT
Start: 2023-12-01 | End: 2024-02-26 | Stop reason: SDUPTHER

## 2023-12-01 RX ORDER — MONTELUKAST SODIUM 10 MG/1
TABLET ORAL
Qty: 90 TABLET | Refills: 0 | Status: SHIPPED | OUTPATIENT
Start: 2023-12-01 | End: 2024-02-26 | Stop reason: SDUPTHER

## 2023-12-11 ENCOUNTER — SPECIALTY PHARMACY (OUTPATIENT)
Dept: PHARMACY | Facility: CLINIC | Age: 68
End: 2023-12-11

## 2023-12-11 PROCEDURE — RXMED WILLOW AMBULATORY MEDICATION CHARGE

## 2023-12-14 ENCOUNTER — PHARMACY VISIT (OUTPATIENT)
Dept: PHARMACY | Facility: CLINIC | Age: 68
End: 2023-12-14
Payer: MEDICARE

## 2023-12-18 DIAGNOSIS — E78.01 FAMILIAL HYPERCHOLESTEROLEMIA: ICD-10-CM

## 2023-12-19 RX ORDER — ROSUVASTATIN CALCIUM 40 MG/1
TABLET, COATED ORAL
Qty: 90 TABLET | Refills: 0 | Status: SHIPPED | OUTPATIENT
Start: 2023-12-19 | End: 2024-03-12

## 2023-12-19 RX ORDER — EZETIMIBE 10 MG/1
TABLET ORAL
Qty: 90 TABLET | Refills: 0 | Status: SHIPPED | OUTPATIENT
Start: 2023-12-19 | End: 2024-03-12

## 2024-02-05 PROCEDURE — RXMED WILLOW AMBULATORY MEDICATION CHARGE

## 2024-02-07 ENCOUNTER — SPECIALTY PHARMACY (OUTPATIENT)
Dept: PHARMACY | Facility: CLINIC | Age: 69
End: 2024-02-07

## 2024-02-08 ENCOUNTER — PHARMACY VISIT (OUTPATIENT)
Dept: PHARMACY | Facility: CLINIC | Age: 69
End: 2024-02-08
Payer: MEDICARE

## 2024-02-19 ENCOUNTER — HOSPITAL ENCOUNTER (OUTPATIENT)
Dept: RADIOLOGY | Facility: CLINIC | Age: 69
Discharge: HOME | End: 2024-02-19
Payer: MEDICARE

## 2024-02-19 DIAGNOSIS — Z12.31 ENCOUNTER FOR SCREENING MAMMOGRAM FOR BREAST CANCER: ICD-10-CM

## 2024-02-19 PROCEDURE — 77067 SCR MAMMO BI INCL CAD: CPT | Performed by: RADIOLOGY

## 2024-02-19 PROCEDURE — 77067 SCR MAMMO BI INCL CAD: CPT

## 2024-02-19 PROCEDURE — 77063 BREAST TOMOSYNTHESIS BI: CPT | Performed by: RADIOLOGY

## 2024-02-26 DIAGNOSIS — I10 BENIGN ESSENTIAL HYPERTENSION: ICD-10-CM

## 2024-02-26 DIAGNOSIS — J30.9 ALLERGIC RHINITIS, UNSPECIFIED SEASONALITY, UNSPECIFIED TRIGGER: ICD-10-CM

## 2024-02-26 DIAGNOSIS — J30.1 SEASONAL ALLERGIC RHINITIS DUE TO POLLEN: ICD-10-CM

## 2024-02-26 RX ORDER — MONTELUKAST SODIUM 10 MG/1
10 TABLET ORAL DAILY
Qty: 90 TABLET | Refills: 0 | Status: SHIPPED | OUTPATIENT
Start: 2024-02-26 | End: 2024-05-31

## 2024-02-26 RX ORDER — FLUTICASONE PROPIONATE 50 MCG
1 SPRAY, SUSPENSION (ML) NASAL DAILY
Qty: 16 G | Refills: 2 | Status: SHIPPED | OUTPATIENT
Start: 2024-02-26 | End: 2024-02-29 | Stop reason: SDUPTHER

## 2024-02-26 RX ORDER — RAMIPRIL 10 MG/1
10 CAPSULE ORAL 2 TIMES DAILY
Qty: 180 CAPSULE | Refills: 0 | Status: SHIPPED | OUTPATIENT
Start: 2024-02-26 | End: 2024-05-31

## 2024-02-26 NOTE — TELEPHONE ENCOUNTER
[facility]: [facility]  Medication(s): Fluticasone Propionate 50 mcg    Ramipril 10 mg once cap twice daily     Montelukast sodium 10 mg once daily   Dose:^  Qty: 90   Last Office Visit: 11/07/2023  Next Office Visit: 05/07/2024

## 2024-02-29 DIAGNOSIS — J30.9 ALLERGIC RHINITIS, UNSPECIFIED SEASONALITY, UNSPECIFIED TRIGGER: ICD-10-CM

## 2024-02-29 NOTE — TELEPHONE ENCOUNTER
Rx Refill Request Telephone Encounter    Name:  Nallely Neumann  :  [id number]  Medication Name:   FLUTICASONE NASAL SPRAY 1 SPRAY ONCE A DAY   Specific [facility]:  [facility]   Date of last appointment:  2023  Date of next appointment:  2024  Best number to reach patient:  [phone]

## 2024-03-01 ENCOUNTER — SPECIALTY PHARMACY (OUTPATIENT)
Dept: PHARMACY | Facility: CLINIC | Age: 69
End: 2024-03-01

## 2024-03-01 RX ORDER — FLUTICASONE PROPIONATE 50 MCG
1 SPRAY, SUSPENSION (ML) NASAL DAILY
Qty: 16 G | Refills: 2 | Status: SHIPPED | OUTPATIENT
Start: 2024-03-01

## 2024-03-06 ENCOUNTER — PHARMACY VISIT (OUTPATIENT)
Dept: PHARMACY | Facility: CLINIC | Age: 69
End: 2024-03-06
Payer: MEDICARE

## 2024-03-06 ENCOUNTER — TELEPHONE (OUTPATIENT)
Dept: SCHEDULING | Age: 69
End: 2024-03-06
Payer: MEDICARE

## 2024-03-06 ENCOUNTER — SPECIALTY PHARMACY (OUTPATIENT)
Dept: PHARMACY | Facility: CLINIC | Age: 69
End: 2024-03-06

## 2024-03-06 PROCEDURE — RXMED WILLOW AMBULATORY MEDICATION CHARGE

## 2024-03-06 NOTE — TELEPHONE ENCOUNTER
[facility]ed  [facility]  They stated that there should be a resolution to PA by Friday, March 8th.  They advised patient to call  [facility] at that time.  I contacted the patient and informed her of this information.  She was not pleased with the service from [facility].  I discussed with her that they are working on PA and if she does not get resolution by Friday to call us back to discuss other options.

## 2024-03-06 NOTE — TELEPHONE ENCOUNTER
Called and spoke to patient.  She stated that she received notification from her insurance company that PA was approved.  Special[facility] pharmacy is attempting to set up delivery for 3/8/24.

## 2024-03-08 ENCOUNTER — TELEPHONE (OUTPATIENT)
Dept: SCHEDULING | Age: 69
End: 2024-03-08
Payer: MEDICARE

## 2024-03-08 DIAGNOSIS — E78.41 ELEVATED LIPOPROTEIN(A): ICD-10-CM

## 2024-03-08 DIAGNOSIS — E78.01 FAMILIAL HYPERCHOLESTEROLEMIA: ICD-10-CM

## 2024-03-08 RX ORDER — EVOLOCUMAB 140 MG/ML
INJECTION, SOLUTION SUBCUTANEOUS
Qty: 6 ML | Refills: 3 | Status: SHIPPED | OUTPATIENT
Start: 2024-03-08 | End: 2024-04-01 | Stop reason: WASHOUT

## 2024-03-08 NOTE — TELEPHONE ENCOUNTER
Talked with patient. She is going to talk with insurance regarding cost of having her repatha sent to [facility] local [facility] versus [facility] and call us back.

## 2024-03-08 NOTE — TELEPHONE ENCOUNTER
Pt called back, reports cost of Repatha would be the same through [facility]. This is where she wishes for her Repatha refills to go moving forward.

## 2024-03-12 DIAGNOSIS — E78.01 FAMILIAL HYPERCHOLESTEROLEMIA: ICD-10-CM

## 2024-03-12 RX ORDER — EZETIMIBE 10 MG/1
TABLET ORAL
Qty: 90 TABLET | Refills: 0 | Status: SHIPPED | OUTPATIENT
Start: 2024-03-12

## 2024-03-12 RX ORDER — ROSUVASTATIN CALCIUM 40 MG/1
TABLET, COATED ORAL
Qty: 90 TABLET | Refills: 0 | Status: SHIPPED | OUTPATIENT
Start: 2024-03-12

## 2024-04-01 ENCOUNTER — APPOINTMENT (OUTPATIENT)
Dept: SLEEP MEDICINE | Facility: CLINIC | Age: 69
End: 2024-04-01
Payer: MEDICARE

## 2024-04-01 DIAGNOSIS — E78.41 ELEVATED LIPOPROTEIN(A): ICD-10-CM

## 2024-04-01 DIAGNOSIS — E78.01 FAMILIAL HYPERCHOLESTEROLEMIA: ICD-10-CM

## 2024-04-01 RX ORDER — EVOLOCUMAB 140 MG/ML
INJECTION, SOLUTION SUBCUTANEOUS
Qty: 6 ML | Refills: 3 | Status: SHIPPED | OUTPATIENT
Start: 2024-04-01 | End: 2024-04-03 | Stop reason: SDUPTHER

## 2024-04-03 RX ORDER — EVOLOCUMAB 140 MG/ML
INJECTION, SOLUTION SUBCUTANEOUS
Qty: 6 ML | Refills: 3 | Status: SHIPPED | OUTPATIENT
Start: 2024-04-03 | End: 2025-04-02

## 2024-04-03 NOTE — TELEPHONE ENCOUNTER
Pt wishes to receive her Repatha through [facility]. Please sign for the script to be transferred there instead of specialty pharmacy. Thank you.

## 2024-04-19 ENCOUNTER — OFFICE VISIT (OUTPATIENT)
Dept: SLEEP MEDICINE | Facility: CLINIC | Age: 69
End: 2024-04-19
Payer: MEDICARE

## 2024-04-19 VITALS
TEMPERATURE: 97.7 F | WEIGHT: 192 LBS | DIASTOLIC BLOOD PRESSURE: 82 MMHG | BODY MASS INDEX: 31.99 KG/M2 | HEART RATE: 87 BPM | HEIGHT: 65 IN | SYSTOLIC BLOOD PRESSURE: 134 MMHG

## 2024-04-19 DIAGNOSIS — G47.33 OBSTRUCTIVE SLEEP APNEA: Primary | ICD-10-CM

## 2024-04-19 DIAGNOSIS — I10 BENIGN ESSENTIAL HYPERTENSION: ICD-10-CM

## 2024-04-19 PROCEDURE — 1159F MED LIST DOCD IN RCRD: CPT | Performed by: STUDENT IN AN ORGANIZED HEALTH CARE EDUCATION/TRAINING PROGRAM

## 2024-04-19 PROCEDURE — 95977 ALYS CPLX CN NPGT PRGRMG: CPT | Performed by: STUDENT IN AN ORGANIZED HEALTH CARE EDUCATION/TRAINING PROGRAM

## 2024-04-19 PROCEDURE — G2211 COMPLEX E/M VISIT ADD ON: HCPCS | Performed by: STUDENT IN AN ORGANIZED HEALTH CARE EDUCATION/TRAINING PROGRAM

## 2024-04-19 PROCEDURE — 1160F RVW MEDS BY RX/DR IN RCRD: CPT | Performed by: STUDENT IN AN ORGANIZED HEALTH CARE EDUCATION/TRAINING PROGRAM

## 2024-04-19 PROCEDURE — 1157F ADVNC CARE PLAN IN RCRD: CPT | Performed by: STUDENT IN AN ORGANIZED HEALTH CARE EDUCATION/TRAINING PROGRAM

## 2024-04-19 PROCEDURE — 3079F DIAST BP 80-89 MM HG: CPT | Performed by: STUDENT IN AN ORGANIZED HEALTH CARE EDUCATION/TRAINING PROGRAM

## 2024-04-19 PROCEDURE — 99214 OFFICE O/P EST MOD 30 MIN: CPT | Performed by: STUDENT IN AN ORGANIZED HEALTH CARE EDUCATION/TRAINING PROGRAM

## 2024-04-19 PROCEDURE — 1036F TOBACCO NON-USER: CPT | Performed by: STUDENT IN AN ORGANIZED HEALTH CARE EDUCATION/TRAINING PROGRAM

## 2024-04-19 PROCEDURE — 3075F SYST BP GE 130 - 139MM HG: CPT | Performed by: STUDENT IN AN ORGANIZED HEALTH CARE EDUCATION/TRAINING PROGRAM

## 2024-04-19 PROCEDURE — 3008F BODY MASS INDEX DOCD: CPT | Performed by: STUDENT IN AN ORGANIZED HEALTH CARE EDUCATION/TRAINING PROGRAM

## 2024-04-19 ASSESSMENT — SLEEP AND FATIGUE QUESTIONNAIRES
HOW LIKELY ARE YOU TO NOD OFF OR FALL ASLEEP WHILE LYING DOWN TO REST IN THE AFTERNOON WHEN CIRCUMSTANCES PERMIT: SLIGHT CHANCE OF DOZING
DIFFICULTY_STAYING_ASLEEP: MODERATE
HOW LIKELY ARE YOU TO NOD OFF OR FALL ASLEEP IN A CAR, WHILE STOPPED FOR A FEW MINUTES IN TRAFFIC: WOULD NEVER DOZE
HOW LIKELY ARE YOU TO NOD OFF OR FALL ASLEEP WHILE WATCHING TV: SLIGHT CHANCE OF DOZING
SATISFACTION_WITH_CURRENT_SLEEP_PATTERN: SATISFIED
ESS-CHAD TOTAL SCORE: 3
DIFFICULTY_FALLING_ASLEEP: MILD
WAKING_TOO_EARLY: MODERATE
HOW LIKELY ARE YOU TO NOD OFF OR FALL ASLEEP WHILE SITTING AND TALKING TO SOMEONE: WOULD NEVER DOZE
HOW LIKELY ARE YOU TO NOD OFF OR FALL ASLEEP WHILE SITTING AND READING: SLIGHT CHANCE OF DOZING
SLEEP_PROBLEM_INTERFERES_DAILY_ACTIVITIES: A LITTLE
SLEEP_PROBLEM_NOTICEABLE_TO_OTHERS: A LITTLE
HOW LIKELY ARE YOU TO NOD OFF OR FALL ASLEEP WHILE SITTING QUIETLY AFTER LUNCH WITHOUT ALCOHOL: WOULD NEVER DOZE
HOW LIKELY ARE YOU TO NOD OFF OR FALL ASLEEP WHEN YOU ARE A PASSENGER IN A CAR FOR AN HOUR WITHOUT A BREAK: WOULD NEVER DOZE
SITING INACTIVE IN A PUBLIC PLACE LIKE A CLASS ROOM OR A MOVIE THEATER: WOULD NEVER DOZE
WORRIED_DISTRESSED_DUE_TO_SLEEP: A LITTLE

## 2024-04-19 ASSESSMENT — ENCOUNTER SYMPTOMS
CONSTITUTIONAL NEGATIVE: 1
CARDIOVASCULAR NEGATIVE: 1
PSYCHIATRIC NEGATIVE: 1
NEUROLOGICAL NEGATIVE: 1
RESPIRATORY NEGATIVE: 1

## 2024-04-19 NOTE — PROGRESS NOTES
" Patient: Nallely Neumann    [id number]  : 1955 -- AGE 68 y.o.    Provider: Ander Gonzalez MD     Location [facility]   Service Date: 2024              [facility]  Followup Visit Note    HISTORY OF PRESENT ILLNESS     HISTORY OF PRESENT ILLNESS   Nallely Neumann is a 68 y.o. female with h/o Hypertension, HUMAIRA, and Obesity who presents to a [facility] for followup.     Assessment and plan from last visit: 10/6/2023    Ms. Neumann is a 68 y.o. female and She returns in followup to the [facility] for their HUMAIRA and inspire management.     Problem List and Orders  Problem List Items Addressed This Visit               ICD-10-CM     Benign essential hypertension I10           BP Readings from Last 1 Encounters:   10/06/23 143/83   - doing well, asymptomatic  - discussed at length the impact of untreated HUMAIRA and BP control  - continue current management and follow-up with PCP            Obstructive sleep apnea - Primary G47.33       - History of sleep apnea, moderate-severe. She is s/p Inspire implantation in 2016 with Dr. Baez. She was initially followed by Dr. Avila and then by Dr. Gusman. She continues to follow with Dr. Gusman and Dr. Faustin for her insomnia and with me for her HUMAIRA therapy.  - She had two pre op sleep tests- HST in  showing AHI 45 and PSG in 2016 (pt notes \"Study was a nightmare\") that showed AHI ~20.   - When she was activated in 2016, FT was 2.2 at default settings, She eventually stepped up to 3.1 at which she had HST, AHI was 10 on that study   - in  she continued to step up to as high as 3.8V thereafter   - we turned it down to 1.8 - 2.7 (@ 2.1 v: LV 4) at last visit on 2022   - she is now using it about 4.5 hrs a night, with multiple pauses  today setting:    - outgoing setting at 2.0-2.5 (@ 2.3v)  - pause time changed to 15 min   - encourage to use it " more           BMI 31.0-31.9,adult Z68.31       - current BMI at ~       BMI Readings from Last 1 Encounters:   10/06/23 31.55 kg/m²   - encouraged healthy weight loss via diet and exercise  - consider referral to the weight loss program at follow-up visit                  Recommendations/Plan:     RTC in 6 months    Current History    On today's visit, the patient reports that she likes the new setting now at 2.3v  She reports that as long as she has the therapy on, her snoring is resolved.  She is feeling good about the therapy.  She still doesn't sleep a lot at night.  About 5-6 hours and she uses the therapy about 5.2 hours a night with avg 4 pauses a night.  BP ok today.    RLS Followup:   none.    Daytime Symptoms    Patient reports DAYTIME SYMPTOMS: no daytime symptoms  Patient denies daytime symptoms including: Denies: excessive daytime sleepiness    Naps: No  Fatigue: denies feeling fatigue    ESS: 3  NOE: 9  FOSQ: 37    REVIEW OF SYSTEMS     REVIEW OF SYSTEMS  Review of Systems   Constitutional: Negative.    HENT: Negative.     Respiratory: Negative.     Cardiovascular: Negative.    Genitourinary: Negative.    Skin: Negative.    Neurological: Negative.    Psychiatric/Behavioral: Negative.           ALLERGIES AND MEDICATIONS     ALLERGIES  Allergies   Allergen Reactions    Bee Pollen Unknown    House Dust Unknown    Ragweed Unknown       MEDICATIONS: She has a current medication list which includes the following prescription(s): alclometasone - Apply topically 2 times a day. to affected area, amlodipine - Take 1 tablet (10 mg) by mouth once daily, aspirin - Take by mouth, calcium-d3-zinc-copper-arnol, cholecalciferol-soy isoflavone, repatha sureclick - INJECT 140 MG (1 PEN) UNDER THE SKIN ONCE EVERY 2 WEEKS AS DIRECTED, ezetimibe - TAKE ONE TABLET BY MOUTH DAILY, fexofenadine - TAKE 1 TABLET DAILY AS NEEDED FOR ALLERGIES by mouth, fluticasone - Administer 1 spray into each nostril once daily, montelukast -  "Take 1 tablet (10 mg) by mouth once daily, multivitamin with minerals iron-free - Take 1 tablet by mouth once daily, ramipril - Take 1 capsule (10 mg) by mouth 2 times a day, rosuvastatin - TAKE ONE TABLET BY MOUTH ONCE DAILY, zolpidem - TAKE ONE-HALF TABLET BY MOUTH ONCE DAILY AT BEDTIME, and paxlovid.    PAST MEDICAL HISTORY : She  has a past medical history of Acute laryngitis (05/01/2023), Allergic (1978), Dorsalgia, unspecified, Heart disease (2011), Hypertension (1998), Insomnia, unspecified, Otitis externa; acute (05/01/2023), Parasomnia, unspecified (11/02/2017), Personal history of other diseases of the circulatory system (06/23/2016), Personal history of other diseases of the nervous system and sense organs, Pure hypercholesterolemia, unspecified (06/22/2016), and Pure hyperglyceridemia.    PAST SURGICAL HISTORY: She  has a past surgical history that includes Other surgical history (07/06/2017); Other surgical history (06/19/2020); Incisional breast biopsy (12/02/2015); and Other surgical history (04/05/2016).     FAMILY HISTORY: No changes since previous visit. Otherwise non-contributory as charted.     SOCIAL HISTORY  She  reports that she has never smoked. She has never used smokeless tobacco. She reports current alcohol use of about 6.0 standard drinks of alcohol per week. She reports that she does not use drugs.       PHYSICAL EXAM     VITAL SIGNS: /82 (BP Location: Right arm, Patient Position: Sitting, BP Cuff Size: Adult)   Pulse 87   Temp 36.5 °C (97.7 °F) (Temporal)   Ht 1.651 m (5' 5\")   Wt 87.1 kg (192 lb)   BMI 31.95 kg/m²      PREVIOUS WEIGHTS:  Wt Readings from Last 3 Encounters:   04/19/24 87.1 kg (192 lb)   11/30/23 84.8 kg (187 lb)   11/06/23 85.8 kg (189 lb 3.2 oz)         RESULTS/DATA     Bicarbonate (mmol/L)   Date Value   11/07/2023 29   05/04/2023 30   10/17/2022 31   04/13/2022 29       PAP Adherence  Not applicable    ASSESSMENT/PLAN     Ms. Neumann is a 68 y.o. female and " "she returns in followup to the [facility] for HUMAIRA.    Problem List, Orders, Assessment, Recommendations:  Problem List Items Addressed This Visit             ICD-10-CM    Benign essential hypertension I10     BP Readings from Last 1 Encounters:   04/19/24 134/82     - doing well, asymptomatic  - discussed at length the impact of untreated HUMAIRA and BP control  - continue current management and follow-up with PCP          Obstructive sleep apnea - Primary G47.33     - History of sleep apnea, moderate-severe. She is s/p Inspire implantation in February 2016 with Dr. Baez. She was initially followed by Dr. Avila and then by Dr. Gusman. She continues to follow with Dr. Gusman and Dr. Faustin for her insomnia and with me for her HUMAIRA therapy.  - She had two pre op sleep tests- HST in 2014 showing AHI 45 and PSG in 2016 Jan (pt notes \"Study was a nightmare\") that showed AHI ~20.   - When she was activated in April 2016, FT was 2.2 at default settings, She eventually stepped up to 3.1 at which she had HST, AHI was 10 on that study  - in 2022 she continued to step up to as high as 3.8V thereafter  - we turned it down to 1.8 - 2.7 (@ 2.1 v: LV 4) at last visit on 12/2022  - she is now using it about 4.5 hrs a night, with multiple pauses    today setting:    - outgoing setting at 2.0-2.4 (@ 2.3v)  -  pause time changed to 10 min, start delay changed to 35 min   - encourage to use it more         BMI 31.0-31.9,adult Z68.31     - current BMI at ~   BMI Readings from Last 1 Encounters:   04/19/24 31.95 kg/m²     - encouraged healthy weight loss via diet and exercise  - consider referral to the weight loss program at follow-up visit              Disposition    Return to clinic in 6 months           "

## 2024-04-19 NOTE — ASSESSMENT & PLAN NOTE
- current BMI at ~   BMI Readings from Last 1 Encounters:   04/19/24 31.95 kg/m²     - encouraged healthy weight loss via diet and exercise  - consider referral to the weight loss program at follow-up visit

## 2024-04-19 NOTE — ASSESSMENT & PLAN NOTE
BP Readings from Last 1 Encounters:   04/19/24 134/82     - doing well, asymptomatic  - discussed at length the impact of untreated HUMAIRA and BP control  - continue current management and follow-up with PCP

## 2024-04-19 NOTE — ASSESSMENT & PLAN NOTE
"- History of sleep apnea, moderate-severe. She is s/p Inspire implantation in February 2016 with Dr. Baez. She was initially followed by Dr. Avila and then by Dr. Gusman. She continues to follow with Dr. Gusman and Dr. Faustin for her insomnia and with me for her HUMAIRA therapy.  - She had two pre op sleep tests- HST in 2014 showing AHI 45 and PSG in 2016 Jan (pt notes \"Study was a nightmare\") that showed AHI ~20.   - When she was activated in April 2016, FT was 2.2 at default settings, She eventually stepped up to 3.1 at which she had HST, AHI was 10 on that study  - in 2022 she continued to step up to as high as 3.8V thereafter  - we turned it down to 1.8 - 2.7 (@ 2.1 v: LV 4) at last visit on 12/2022  - she is now using it about 4.5 hrs a night, with multiple pauses    today setting:    - outgoing setting at 2.0-2.4 (@ 2.3v)  -  pause time changed to 10 min, start delay changed to 35 min   - encourage to use it more  "

## 2024-04-29 DIAGNOSIS — F51.04 CHRONIC INSOMNIA: ICD-10-CM

## 2024-04-29 DIAGNOSIS — Z79.899 MEDICATION MANAGEMENT: ICD-10-CM

## 2024-04-29 RX ORDER — ZOLPIDEM TARTRATE 5 MG/1
TABLET ORAL
Qty: 45 TABLET | Refills: 0 | Status: SHIPPED | OUTPATIENT
Start: 2024-04-29 | End: 2024-06-04

## 2024-05-01 ENCOUNTER — TELEPHONE (OUTPATIENT)
Dept: PRIMARY CARE | Facility: CLINIC | Age: 69
End: 2024-05-01
Payer: MEDICARE

## 2024-05-01 NOTE — TELEPHONE ENCOUNTER
PT called in stating her insurance is requiring a PA on Zolpidem and is only wanting to give it to her 90 days out of the year. Pt states a Pa request should be coming from [employer]

## 2024-05-03 ENCOUNTER — TELEPHONE (OUTPATIENT)
Dept: PRIMARY CARE | Facility: CLINIC | Age: 69
End: 2024-05-03
Payer: MEDICARE

## 2024-05-03 PROBLEM — R92.30 DENSE BREASTS, UNSPECIFIED: Status: ACTIVE | Noted: 2023-05-01

## 2024-05-03 PROBLEM — Z86.79 HISTORY OF HYPERTENSION: Status: ACTIVE | Noted: 2024-05-03

## 2024-05-03 PROBLEM — G47.9 DIFFICULTY SLEEPING: Status: ACTIVE | Noted: 2024-05-03

## 2024-05-03 PROBLEM — S61.219A LACERATION OF FINGER: Status: RESOLVED | Noted: 2024-05-03 | Resolved: 2024-05-03

## 2024-05-03 NOTE — TELEPHONE ENCOUNTER
Discussed with pt Insurance wants her to try Doxepin Dr Santos is willing to write a script for 30 days if she wants to try this or she can pay for it with [employer] after lengthy discussion she will pay for with [employer]

## 2024-05-03 NOTE — TELEPHONE ENCOUNTER
Pt is very concerned about the Ambien being refilled.  Currently the insurance is denying.  Pt does have an appointment in office on Tuesday but is hoping the appeal that the insurance is sending could be filled out and sent back prior to this if possible.

## 2024-05-07 ENCOUNTER — OFFICE VISIT (OUTPATIENT)
Dept: PRIMARY CARE | Facility: CLINIC | Age: 69
End: 2024-05-07
Payer: MEDICARE

## 2024-05-07 VITALS
HEIGHT: 65 IN | OXYGEN SATURATION: 96 % | SYSTOLIC BLOOD PRESSURE: 126 MMHG | HEART RATE: 72 BPM | BODY MASS INDEX: 31.49 KG/M2 | WEIGHT: 189 LBS | DIASTOLIC BLOOD PRESSURE: 66 MMHG

## 2024-05-07 DIAGNOSIS — E78.00 PURE HYPERCHOLESTEROLEMIA: ICD-10-CM

## 2024-05-07 DIAGNOSIS — Z00.00 ROUTINE GENERAL MEDICAL EXAMINATION AT HEALTH CARE FACILITY: ICD-10-CM

## 2024-05-07 DIAGNOSIS — Z79.899 MEDICATION MANAGEMENT: ICD-10-CM

## 2024-05-07 DIAGNOSIS — I10 BENIGN ESSENTIAL HYPERTENSION: Primary | ICD-10-CM

## 2024-05-07 DIAGNOSIS — F51.04 CHRONIC INSOMNIA: ICD-10-CM

## 2024-05-07 LAB
AMPHETAMINES UR QL SCN: NORMAL
BARBITURATES UR QL SCN: NORMAL
BENZODIAZ UR QL SCN: NORMAL
BZE UR QL SCN: NORMAL
CANNABINOIDS UR QL SCN: NORMAL
FENTANYL+NORFENTANYL UR QL SCN: NORMAL
METHADONE UR QL SCN: NORMAL
OPIATES UR QL SCN: NORMAL
OXYCODONE+OXYMORPHONE UR QL SCN: NORMAL
PCP UR QL SCN: NORMAL

## 2024-05-07 PROCEDURE — 1160F RVW MEDS BY RX/DR IN RCRD: CPT | Performed by: FAMILY MEDICINE

## 2024-05-07 PROCEDURE — 80307 DRUG TEST PRSMV CHEM ANLYZR: CPT

## 2024-05-07 PROCEDURE — 80368 SEDATIVE HYPNOTICS: CPT

## 2024-05-07 PROCEDURE — 1159F MED LIST DOCD IN RCRD: CPT | Performed by: FAMILY MEDICINE

## 2024-05-07 PROCEDURE — 1157F ADVNC CARE PLAN IN RCRD: CPT | Performed by: FAMILY MEDICINE

## 2024-05-07 PROCEDURE — 1170F FXNL STATUS ASSESSED: CPT | Performed by: FAMILY MEDICINE

## 2024-05-07 PROCEDURE — 99214 OFFICE O/P EST MOD 30 MIN: CPT | Performed by: FAMILY MEDICINE

## 2024-05-07 PROCEDURE — 3074F SYST BP LT 130 MM HG: CPT | Performed by: FAMILY MEDICINE

## 2024-05-07 PROCEDURE — 3008F BODY MASS INDEX DOCD: CPT | Performed by: FAMILY MEDICINE

## 2024-05-07 PROCEDURE — 1036F TOBACCO NON-USER: CPT | Performed by: FAMILY MEDICINE

## 2024-05-07 PROCEDURE — 3078F DIAST BP <80 MM HG: CPT | Performed by: FAMILY MEDICINE

## 2024-05-07 RX ORDER — LACTOBACILLUS RHAMNOSUS GG 15B CELL
1 CAPSULE, SPRINKLE ORAL 2 TIMES DAILY
COMMUNITY
Start: 2023-04-01

## 2024-05-07 RX ORDER — TRAZODONE HYDROCHLORIDE 50 MG/1
50 TABLET ORAL NIGHTLY PRN
Qty: 30 TABLET | Refills: 0 | Status: SHIPPED | OUTPATIENT
Start: 2024-05-07 | End: 2025-05-07

## 2024-05-07 ASSESSMENT — ENCOUNTER SYMPTOMS
UNEXPECTED WEIGHT CHANGE: 0
APPETITE CHANGE: 0
NAUSEA: 0

## 2024-05-07 ASSESSMENT — PATIENT HEALTH QUESTIONNAIRE - PHQ9
SUM OF ALL RESPONSES TO PHQ9 QUESTIONS 1 AND 2: 0
1. LITTLE INTEREST OR PLEASURE IN DOING THINGS: NOT AT ALL
2. FEELING DOWN, DEPRESSED OR HOPELESS: NOT AT ALL

## 2024-05-07 ASSESSMENT — ACTIVITIES OF DAILY LIVING (ADL)
DRESSING: INDEPENDENT
MANAGING_FINANCES: INDEPENDENT
BATHING: INDEPENDENT
TAKING_MEDICATION: INDEPENDENT
DOING_HOUSEWORK: INDEPENDENT
GROCERY_SHOPPING: INDEPENDENT

## 2024-05-07 NOTE — PROGRESS NOTES
"Subjective   Patient ID: Nallely Neumann is a 68 y.o. female who presents for Medicare Annual Wellness Visit Subsequent (Wants to discuss colonoscopy that was done per Dr. Brink , is not sure what the follow up is brought the report has a twisted colon?/Due for blood work?).    HPI   Patient has hx of stable hypertension, hyperlipidemia.  Pt denies chest pain, shortness of breath and edema.  Patient's current treatment as listed in Rx.  Patient is compliant with treatment and complains of no side effects associated treatment.    Review of Systems   Constitutional:  Negative for appetite change and unexpected weight change.   Eyes:  Negative for visual disturbance.   Gastrointestinal:  Negative for nausea.       Objective   /66   Pulse 72   Ht 1.651 m (5' 5\")   Wt 85.7 kg (189 lb)   SpO2 96%   BMI 31.45 kg/m²     Physical Exam  HENT:      Head: Normocephalic and atraumatic.      Nose: Nose normal.      Mouth/Throat:      Mouth: Mucous membranes are moist.      Pharynx: No oropharyngeal exudate.   Eyes:      Extraocular Movements: Extraocular movements intact.      Conjunctiva/sclera: Conjunctivae normal.      Pupils: Pupils are equal, round, and reactive to light.   Cardiovascular:      Rate and Rhythm: Normal rate and regular rhythm.   Pulmonary:      Effort: Pulmonary effort is normal.   Abdominal:      General: There is no distension.      Palpations: Abdomen is soft.   Musculoskeletal:      Cervical back: Normal range of motion and neck supple.   Lymphadenopathy:      Cervical: No cervical adenopathy.   Neurological:      General: No focal deficit present.      Mental Status: She is alert.   Psychiatric:         Attention and Perception: Attention normal.         Speech: Speech normal.         Behavior: Behavior is cooperative.         Assessment/Plan   Problem List Items Addressed This Visit             ICD-10-CM    Benign essential hypertension - Primary I10     Treated and controlled         Relevant " Orders    Lipid Panel    CBC and Auto Differential    Comprehensive Metabolic Panel    Lipoprotein A (LPA)    Chronic insomnia F51.04    Relevant Medications    traZODone (Desyrel) 50 mg tablet    Other Relevant Orders    Drug Screen, Urine With Reflex to Confirmation    Zolpidem Confirmation, Urine    OOB Internal Tracking    Pure hypercholesterolemia E78.00     Treated and controlled         Relevant Orders    Lipid Panel    CBC and Auto Differential    Comprehensive Metabolic Panel    Lipoprotein A (LPA)     Other Visit Diagnoses         Codes    Medication management     Z79.899    Relevant Orders    Drug Screen, Urine With Reflex to Confirmation    Zolpidem Confirmation, Urine    OOB Internal Tracking    Routine general medical examination at health care facility     Z00.00         LM discussed  I have personally reviewed the OARRS report for this patient. I have considered the risks of abuse, dependence, addiction and diversion.    Recheck 6 months sooner if any issues arise

## 2024-05-09 ENCOUNTER — LAB (OUTPATIENT)
Dept: LAB | Facility: LAB | Age: 69
End: 2024-05-09
Payer: MEDICARE

## 2024-05-09 DIAGNOSIS — E78.00 PURE HYPERCHOLESTEROLEMIA: ICD-10-CM

## 2024-05-09 DIAGNOSIS — I10 BENIGN ESSENTIAL HYPERTENSION: ICD-10-CM

## 2024-05-09 LAB
ALBUMIN SERPL BCP-MCNC: 4.5 G/DL (ref 3.4–5)
ALP SERPL-CCNC: 69 U/L (ref 33–136)
ALT SERPL W P-5'-P-CCNC: 23 U/L (ref 7–45)
ANION GAP SERPL CALC-SCNC: 13 MMOL/L (ref 10–20)
AST SERPL W P-5'-P-CCNC: 20 U/L (ref 9–39)
BASOPHILS # BLD AUTO: 0.06 X10*3/UL (ref 0–0.1)
BASOPHILS NFR BLD AUTO: 1.1 %
BILIRUB SERPL-MCNC: 0.6 MG/DL (ref 0–1.2)
BUN SERPL-MCNC: 16 MG/DL (ref 6–23)
CALCIUM SERPL-MCNC: 9.7 MG/DL (ref 8.6–10.3)
CHLORIDE SERPL-SCNC: 100 MMOL/L (ref 98–107)
CHOLEST SERPL-MCNC: 141 MG/DL (ref 0–199)
CHOLESTEROL/HDL RATIO: 1.5
CO2 SERPL-SCNC: 30 MMOL/L (ref 21–32)
CREAT SERPL-MCNC: 0.76 MG/DL (ref 0.5–1.05)
EGFRCR SERPLBLD CKD-EPI 2021: 84 ML/MIN/1.73M*2
EOSINOPHIL # BLD AUTO: 0.27 X10*3/UL (ref 0–0.7)
EOSINOPHIL NFR BLD AUTO: 5.2 %
ERYTHROCYTE [DISTWIDTH] IN BLOOD BY AUTOMATED COUNT: 12.7 % (ref 11.5–14.5)
GLUCOSE SERPL-MCNC: 92 MG/DL (ref 74–99)
HCT VFR BLD AUTO: 46.3 % (ref 36–46)
HDLC SERPL-MCNC: 93.4 MG/DL
HGB BLD-MCNC: 14.9 G/DL (ref 12–16)
IMM GRANULOCYTES # BLD AUTO: 0.02 X10*3/UL (ref 0–0.7)
IMM GRANULOCYTES NFR BLD AUTO: 0.4 % (ref 0–0.9)
LDLC SERPL CALC-MCNC: 38 MG/DL
LYMPHOCYTES # BLD AUTO: 1.42 X10*3/UL (ref 1.2–4.8)
LYMPHOCYTES NFR BLD AUTO: 27.2 %
MCH RBC QN AUTO: 27.3 PG (ref 26–34)
MCHC RBC AUTO-ENTMCNC: 32.2 G/DL (ref 32–36)
MCV RBC AUTO: 85 FL (ref 80–100)
MONOCYTES # BLD AUTO: 0.53 X10*3/UL (ref 0.1–1)
MONOCYTES NFR BLD AUTO: 10.2 %
NEUTROPHILS # BLD AUTO: 2.92 X10*3/UL (ref 1.2–7.7)
NEUTROPHILS NFR BLD AUTO: 55.9 %
NON HDL CHOLESTEROL: 48 MG/DL (ref 0–149)
NRBC BLD-RTO: 0 /100 WBCS (ref 0–0)
PLATELET # BLD AUTO: 351 X10*3/UL (ref 150–450)
POTASSIUM SERPL-SCNC: 4.5 MMOL/L (ref 3.5–5.3)
PROT SERPL-MCNC: 6.7 G/DL (ref 6.4–8.2)
RBC # BLD AUTO: 5.46 X10*6/UL (ref 4–5.2)
SODIUM SERPL-SCNC: 138 MMOL/L (ref 136–145)
TRIGL SERPL-MCNC: 47 MG/DL (ref 0–149)
VLDL: 9 MG/DL (ref 0–40)
WBC # BLD AUTO: 5.2 X10*3/UL (ref 4.4–11.3)
ZOLPIDEM UR CFM-MCNC: 430 NG/ML
ZOLPIDEM UR-MCNC: <25 NG/ML

## 2024-05-09 PROCEDURE — 80053 COMPREHEN METABOLIC PANEL: CPT

## 2024-05-09 PROCEDURE — 82172 ASSAY OF APOLIPOPROTEIN: CPT

## 2024-05-09 PROCEDURE — 85025 COMPLETE CBC W/AUTO DIFF WBC: CPT

## 2024-05-09 PROCEDURE — 80061 LIPID PANEL: CPT

## 2024-05-09 PROCEDURE — 36415 COLL VENOUS BLD VENIPUNCTURE: CPT

## 2024-05-11 LAB — LPA SERPL-MCNC: 229 MG/DL

## 2024-05-16 ENCOUNTER — OFFICE VISIT (OUTPATIENT)
Dept: OPHTHALMOLOGY | Facility: CLINIC | Age: 69
End: 2024-05-16
Payer: MEDICARE

## 2024-05-16 DIAGNOSIS — H25.013 CORTICAL AGE-RELATED CATARACT, BILATERAL: ICD-10-CM

## 2024-05-16 DIAGNOSIS — H40.003 GLAUCOMA SUSPECT OF BOTH EYES: Primary | ICD-10-CM

## 2024-05-16 PROCEDURE — 92133 CPTRZD OPH DX IMG PST SGM ON: CPT | Performed by: OPHTHALMOLOGY

## 2024-05-16 PROCEDURE — 99214 OFFICE O/P EST MOD 30 MIN: CPT | Performed by: OPHTHALMOLOGY

## 2024-05-16 ASSESSMENT — TONOMETRY
OS_IOP_MMHG: 12
IOP_METHOD: GOLDMANN APPLANATION
OD_IOP_MMHG: 12

## 2024-05-16 ASSESSMENT — CUP TO DISC RATIO
OD_RATIO: 0.2
OS_RATIO: 0.1

## 2024-05-16 ASSESSMENT — ENCOUNTER SYMPTOMS
NEUROLOGICAL NEGATIVE: 0
CARDIOVASCULAR NEGATIVE: 0
ALLERGIC/IMMUNOLOGIC NEGATIVE: 0
CONSTITUTIONAL NEGATIVE: 0
PSYCHIATRIC NEGATIVE: 0
ENDOCRINE NEGATIVE: 0
RESPIRATORY NEGATIVE: 0
HEMATOLOGIC/LYMPHATIC NEGATIVE: 0
GASTROINTESTINAL NEGATIVE: 0
MUSCULOSKELETAL NEGATIVE: 0
EYES NEGATIVE: 1

## 2024-05-16 ASSESSMENT — CONF VISUAL FIELD
OD_INFERIOR_TEMPORAL_RESTRICTION: 0
OD_SUPERIOR_TEMPORAL_RESTRICTION: 0
OD_NORMAL: 1
OS_SUPERIOR_TEMPORAL_RESTRICTION: 0
OD_SUPERIOR_NASAL_RESTRICTION: 0
OS_NORMAL: 1
OS_INFERIOR_TEMPORAL_RESTRICTION: 0
OS_INFERIOR_NASAL_RESTRICTION: 0
OS_SUPERIOR_NASAL_RESTRICTION: 0
OD_INFERIOR_NASAL_RESTRICTION: 0

## 2024-05-16 ASSESSMENT — EXTERNAL EXAM - RIGHT EYE: OD_EXAM: NORMAL

## 2024-05-16 ASSESSMENT — VISUAL ACUITY
OD_CC: 20/20
OS_CC+: -1
METHOD: SNELLEN - LINEAR
OD_CC+: -2
OS_CC: 20/20

## 2024-05-16 ASSESSMENT — EXTERNAL EXAM - LEFT EYE: OS_EXAM: NORMAL

## 2024-05-16 ASSESSMENT — SLIT LAMP EXAM - LIDS
COMMENTS: GOOD POSITION
COMMENTS: GOOD POSITION

## 2024-05-16 NOTE — PROGRESS NOTES
Visual Acuity (Snellen - Linear)         Right Left    Dist cc 20/20 -2 20/20 -1          Tonometry       Tonometry (Goldmann Applanation, 8:08 AM)         Right Left    Pressure 12 12                  Assessment/Plan   Last dilated:  5/16/24    1.  Cataract OU:  minimally visually significant; MRx improves to 20/20 OU -  pt asymptomatic      Plan:  MRx given as a copy                f/u 1 year (dilation, RNFL)    2.  ON Asymmetry OU:  RNFL WNL      Plan:  monitor

## 2024-05-30 DIAGNOSIS — J30.1 SEASONAL ALLERGIC RHINITIS DUE TO POLLEN: ICD-10-CM

## 2024-05-30 DIAGNOSIS — I10 BENIGN ESSENTIAL HYPERTENSION: ICD-10-CM

## 2024-05-31 RX ORDER — RAMIPRIL 10 MG/1
10 CAPSULE ORAL 2 TIMES DAILY
Qty: 180 CAPSULE | Refills: 0 | Status: SHIPPED | OUTPATIENT
Start: 2024-05-31

## 2024-05-31 RX ORDER — MONTELUKAST SODIUM 10 MG/1
10 TABLET ORAL DAILY
Qty: 90 TABLET | Refills: 0 | Status: SHIPPED | OUTPATIENT
Start: 2024-05-31

## 2024-06-03 DIAGNOSIS — F51.04 CHRONIC INSOMNIA: ICD-10-CM

## 2024-06-03 DIAGNOSIS — Z79.899 MEDICATION MANAGEMENT: ICD-10-CM

## 2024-06-04 RX ORDER — ZOLPIDEM TARTRATE 5 MG/1
TABLET ORAL
Qty: 45 TABLET | Refills: 0 | Status: SHIPPED | OUTPATIENT
Start: 2024-06-04

## 2024-06-06 ENCOUNTER — TELEPHONE (OUTPATIENT)
Dept: PRIMARY CARE | Facility: CLINIC | Age: 69
End: 2024-06-06
Payer: MEDICARE

## 2024-06-06 NOTE — TELEPHONE ENCOUNTER
Pt calling [facility] told her that the ins still came back saying they will only cover 90 for a year since she tried the trazodone which didn't work.  Pt asking if a PA can be done for this if not she will pay out of pocket.    [facility]

## 2024-06-06 NOTE — TELEPHONE ENCOUNTER
PC to [facility] on the line for 30 min with a representative who transferred me to a pharmacist who will submit the case back to a medical director with the additional trial of Trazodone should hear within 72 hours if they will reopen the case and if not will receive a fax denial and a number to contact for a level 3 authorization.   Pt notified and will keep her informed.

## 2024-06-06 NOTE — TELEPHONE ENCOUNTER
Preferred medication is Zaleplon (Sonata) would you agree she needs to try this first? Please advise.

## 2024-06-07 NOTE — TELEPHONE ENCOUNTER
Pt stated she received message from insurance, and the request was again rejected.  Pt is going to purchase out-of-pocket.  She was grateful for OSWALDO's help and patience through this process.

## 2024-06-20 DIAGNOSIS — E78.01 FAMILIAL HYPERCHOLESTEROLEMIA: ICD-10-CM

## 2024-06-20 RX ORDER — ROSUVASTATIN CALCIUM 40 MG/1
TABLET, COATED ORAL
Qty: 90 TABLET | Refills: 1 | Status: SHIPPED | OUTPATIENT
Start: 2024-06-20

## 2024-06-20 RX ORDER — EZETIMIBE 10 MG/1
TABLET ORAL
Qty: 90 TABLET | Refills: 1 | Status: SHIPPED | OUTPATIENT
Start: 2024-06-20

## 2024-08-27 DIAGNOSIS — I10 BENIGN ESSENTIAL HYPERTENSION: ICD-10-CM

## 2024-08-27 DIAGNOSIS — J30.1 SEASONAL ALLERGIC RHINITIS DUE TO POLLEN: ICD-10-CM

## 2024-08-27 DIAGNOSIS — I10 BENIGN ESSENTIAL HYPERTENSION: Primary | ICD-10-CM

## 2024-08-27 RX ORDER — RAMIPRIL 10 MG/1
10 CAPSULE ORAL 2 TIMES DAILY
Qty: 180 CAPSULE | Refills: 3 | Status: SHIPPED | OUTPATIENT
Start: 2024-08-27

## 2024-08-27 RX ORDER — MONTELUKAST SODIUM 10 MG/1
10 TABLET ORAL DAILY
Qty: 90 TABLET | Refills: 3 | Status: SHIPPED | OUTPATIENT
Start: 2024-08-27

## 2024-08-28 RX ORDER — AMLODIPINE BESYLATE 10 MG/1
10 TABLET ORAL DAILY
Qty: 90 TABLET | Refills: 3 | Status: SHIPPED | OUTPATIENT
Start: 2024-08-28

## 2024-09-15 NOTE — PROGRESS NOTES
Primary Care Physician: Pino Santos MD  Date of Visit: 09/16/2024  8:00 AM EDT  Location of visit: [facility] [address]   Last office visit: September 11, 2023    Chief Complaint:     Familial hypercholesterolemia    HPI/Summary  Nallely Neumann is a 69 y.o. female who presents for followup cardiology evaluation.     She presents with FH, hypertension, and a strongly positive family history for premature CAD.  Prior to initiation of drug therapy, the cholesterol was greater than 350 g/dL.  Also, a markedly elevated lipoprotein a, greater than 600.  2018, coronary calcium score was 278.  In 2019, Repatha was instituted, with a further 50% reduction in LDL cholesterol.  A stress test in 2016, which was a stress echocardiogram, was negative at a workload of 8.3 METS.    She continues on aspirin, amlodipine, high-dose rosuvastatin, ezetimibe, ramipril and Repatha.    Recent laboratory review: Cholesterol 141, HDL 93, LDL 38, triglycerides 47.  Lipoprotein a 229.  (Was greater than 614 6 years ago).    She just returned from a long trip to [facility].  Noticed some edema after a long trip, was not wearing compression.  Occasional muscle cramps, wondering about magnesium supplementation.  No chest pain or other cardiovascular symptoms at this time.    Specialty Problems          Cardiology Problems    Benign essential hypertension    Elevated lipoprotein(a)    Familial hypercholesterolemia    High coronary artery calcium score    Pure hypercholesterolemia    History of hypertension        Past Medical History:   Diagnosis Date    Acute laryngitis 05/01/2023    Allergic 1978    Dorsalgia, unspecified     Back pain, acute    Heart disease 2011    Hypertension 1998    Insomnia, unspecified     Insomnia, controlled    Laceration of finger 05/03/2024    Otitis externa; acute 05/01/2023    Parasomnia, unspecified 11/02/2017    Parasomnia, unspecified    Personal history of other diseases of the circulatory system 06/23/2016     History of hypertension    Personal history of other diseases of the nervous system and sense organs     History of sleep apnea    Pure hypercholesterolemia, unspecified 06/22/2016    High cholesterol    Pure hyperglyceridemia     High triglycerides          Past Surgical History:   Procedure Laterality Date    INCISIONAL BREAST BIOPSY  12/02/2015    Incisional Breast Biopsy    OTHER SURGICAL HISTORY  07/06/2017    Intramural Ablation Of Turbinate Soft Tissue    OTHER SURGICAL HISTORY  06/19/2020    Nasal septoplasty    OTHER SURGICAL HISTORY  04/05/2016    Surgery            Social History     Tobacco Use    Smoking status: Never    Smokeless tobacco: Never   Vaping Use    Vaping status: Never Used   Substance Use Topics    Alcohol use: Yes     Alcohol/week: 6.0 standard drinks of alcohol     Types: 6 Glasses of wine per week    Drug use: Never                 Allergies   Allergen Reactions    Bee Pollen Unknown    House Dust Unknown    Ragweed Unknown         Current Outpatient Medications   Medication Instructions    alclometasone (Aclovate) 0.05 % cream Topical, 2 times daily, to affected area    amLODIPine (NORVASC) 10 mg, oral, Daily    aspirin 81 mg EC tablet oral    calcium-D3-zinc-copper-arnol (Citracal-D3 Maximum Plus) 325 mg-12.5 mcg -2.75 mg tablet     cholecalciferol-soy isoflavone 2,000-64 unit-mg tablet     evolocumab (Repatha SureClick) 140 mg/mL injection INJECT 140 MG (1 PEN) UNDER THE SKIN ONCE EVERY 2 WEEKS AS DIRECTED.    ezetimibe (Zetia) 10 mg tablet TAKE ONE TABLET BY MOUTH DAILY    fexofenadine (Allegra) 180 mg tablet TAKE 1 TABLET DAILY AS NEEDED FOR ALLERGIES by mouth    fluticasone (Flonase) 50 mcg/actuation nasal spray 1 spray, Each Nostril, Daily    Lactobacillus rhamnosus GG (Culturelle) 15 billion cell capsule, sprinkle capsule 1 capsule, 2 times daily    montelukast (SINGULAIR) 10 mg, oral, Daily    multivitamin with minerals iron-free (Centrum Silver) 1 tablet, oral, Daily     "ramipril (ALTACE) 10 mg, oral, 2 times daily    rosuvastatin (Crestor) 40 mg tablet TAKE ONE TABLET BY MOUTH ONCE DAILY.    zolpidem (Ambien) 5 mg tablet TAKE ONE-HALF TABLET BY MOUTH ONCE DAILY AT BEDTIME       ROS     Vital Signs:  Vitals:    09/16/24 0757   BP: 114/73   BP Location: Left arm   Patient Position: Sitting   BP Cuff Size: Large adult   Pulse: 74   SpO2: 95%   Weight: 84.9 kg (187 lb 3 oz)   Height: 1.651 m (5' 5\")     Wt Readings from Last 2 Encounters:   09/16/24 84.9 kg (187 lb 3 oz)   05/07/24 85.7 kg (189 lb)     Body mass index is 31.15 kg/m².   Noted 5 pound weight loss since last year    Physical Exam:    On examination she appeared in good health and spirits. Vital signs as documented. Skin warm and dry and without overt rashes. Neck without JVD. Lungs clear. Heart exam notable for regular rhythm, normal sounds and absence of murmurs, rubs or gallops. Abdomen unremarkable and without evidence of organomegaly, masses, or abdominal aortic enlargement. Extremities nonedematous.     Last Labs:  CMP:  Recent Labs     05/09/24  0705 11/07/23  0759 05/04/23  0748 10/17/22  0753 04/20/22  0743 04/13/22  0708    138 138 137  --  138   K 4.5 4.4 4.7 4.4 4.4 5.6*    100 100 101  --  101   CO2 30 29 30 31  --  29   ANIONGAP 13 13 13 9*  --  14   BUN 16 15 15 13  --  18   CREATININE 0.76 0.75 0.81 0.56  --  0.73   EGFR 84 86  --   --   --   --    GLUCOSE 92 91 97 101*  --  103*     Recent Labs     05/09/24  0705 05/04/23  0748 10/17/22  0753 10/27/21  0716 11/21/19  2335   ALBUMIN 4.5 4.6 4.7 4.5 4.7   ALKPHOS 69 66 75 74 93   ALT 23 25 27 26 22   AST 20 22 23 21 20   BILITOT 0.6 0.6 0.7 0.5 0.3     CBC:  Recent Labs     05/09/24  0705 11/07/23  0759 05/04/23  0748 10/17/22  0753 10/27/21  0716   WBC 5.2 5.0 4.8 5.3 5.3   HGB 14.9 14.4 14.6 14.8 14.4   HCT 46.3* 45.4 46.6* 45.3 45.3    361 298 309 334   MCV 85 85 86 84 84     COAG:   Recent Labs     11/21/19  2335   INR 1.0 " "    HEME/ENDO:  Recent Labs     11/11/19  0759   TSH 1.96   HGBA1C 5.5      CARDIAC: No results for input(s): \"LDH\", \"CKMB\", \"TROPHS\", \"BNP\" in the last 95980 hours.    No lab exists for component: \"CK\", \"CKMBP\"  Recent Labs     05/09/24  0705 11/07/23  0759 05/04/23  0748 10/17/22  0753 04/13/22  0708   CHOL 141 142 140 145 122   LDLF  --   --  42 47 31   HDL 93.4 97.0 85.1 85.7 79.0   TRIG 47 48 64 62 60       Last Cardiology Tests:    ECG:    Normal sinus rhythm, normal ECG.    Echo:  Echo Results:  No results found for this or any previous visit from the past 3650 days.       Cath:      Stress Test:  Stress Results:  No results found for this or any previous visit from the past 365 days.         Cardiac Imaging:        Assessment/Plan     The patient presents with familial hypercholesterolemia, well-controlled hypertension, and a markedly elevated lipoprotein a.  A coronary calcium score was elevated, but this was more than 5 years ago.  The ECG is normal.  Blood pressures are at goal, and we are pleased that she has lost a few pounds.  She remains at high risk for a clinical cardiovascular event, and for this reason we will obtain a screening stress test.  The last study was 8 years ago.  We will continue all current medications, and we will likely institute specific treatment for elevated lipoprotein when new therapies are available.  1 year visit.      Orders:  Orders Placed This Encounter   Procedures    Stress Test    ECG 12 lead (Clinic Performed)      Followup Appts:  Future Appointments   Date Time Provider Department Center   9/17/2024  2:20 PM Pino Brink DO [id number] [facility]   10/25/2024  8:00 AM Ander Gonzalez MD [id number] [facility]   11/12/2024  8:30 AM Pino Santos MD [id number] [facility]   5/22/2025  8:00 AM Rhys Monzon MD [id number] [facility]           ____________________________________________________________  Polo Poe MD    Senior Attending Physician  [facility] " [facility]    [facility]  [employer]

## 2024-09-16 ENCOUNTER — OFFICE VISIT (OUTPATIENT)
Dept: CARDIOLOGY | Facility: CLINIC | Age: 69
End: 2024-09-16
Payer: MEDICARE

## 2024-09-16 VITALS
SYSTOLIC BLOOD PRESSURE: 114 MMHG | WEIGHT: 187.19 LBS | BODY MASS INDEX: 31.19 KG/M2 | HEART RATE: 74 BPM | OXYGEN SATURATION: 95 % | DIASTOLIC BLOOD PRESSURE: 73 MMHG | HEIGHT: 65 IN

## 2024-09-16 DIAGNOSIS — E78.01 FAMILIAL HYPERCHOLESTEROLEMIA: Primary | ICD-10-CM

## 2024-09-16 DIAGNOSIS — E78.41 ELEVATED LIPOPROTEIN(A): ICD-10-CM

## 2024-09-16 DIAGNOSIS — I10 BENIGN ESSENTIAL HYPERTENSION: ICD-10-CM

## 2024-09-16 DIAGNOSIS — R93.1 HIGH CORONARY ARTERY CALCIUM SCORE: ICD-10-CM

## 2024-09-16 PROCEDURE — 93010 ELECTROCARDIOGRAM REPORT: CPT | Performed by: INTERNAL MEDICINE

## 2024-09-16 PROCEDURE — 99214 OFFICE O/P EST MOD 30 MIN: CPT | Performed by: INTERNAL MEDICINE

## 2024-09-16 PROCEDURE — 3078F DIAST BP <80 MM HG: CPT | Performed by: INTERNAL MEDICINE

## 2024-09-16 PROCEDURE — 1036F TOBACCO NON-USER: CPT | Performed by: INTERNAL MEDICINE

## 2024-09-16 PROCEDURE — 1126F AMNT PAIN NOTED NONE PRSNT: CPT | Performed by: INTERNAL MEDICINE

## 2024-09-16 PROCEDURE — 3074F SYST BP LT 130 MM HG: CPT | Performed by: INTERNAL MEDICINE

## 2024-09-16 PROCEDURE — 3008F BODY MASS INDEX DOCD: CPT | Performed by: INTERNAL MEDICINE

## 2024-09-16 PROCEDURE — 1159F MED LIST DOCD IN RCRD: CPT | Performed by: INTERNAL MEDICINE

## 2024-09-16 PROCEDURE — 93005 ELECTROCARDIOGRAM TRACING: CPT | Performed by: INTERNAL MEDICINE

## 2024-09-16 PROCEDURE — 1157F ADVNC CARE PLAN IN RCRD: CPT | Performed by: INTERNAL MEDICINE

## 2024-09-16 ASSESSMENT — COLUMBIA-SUICIDE SEVERITY RATING SCALE - C-SSRS
1. IN THE PAST MONTH, HAVE YOU WISHED YOU WERE DEAD OR WISHED YOU COULD GO TO SLEEP AND NOT WAKE UP?: NO
6. HAVE YOU EVER DONE ANYTHING, STARTED TO DO ANYTHING, OR PREPARED TO DO ANYTHING TO END YOUR LIFE?: NO
2. HAVE YOU ACTUALLY HAD ANY THOUGHTS OF KILLING YOURSELF?: NO

## 2024-09-16 ASSESSMENT — ENCOUNTER SYMPTOMS
OCCASIONAL FEELINGS OF UNSTEADINESS: 0
LOSS OF SENSATION IN FEET: 0
DEPRESSION: 0

## 2024-09-16 ASSESSMENT — PAIN SCALES - GENERAL: PAINLEVEL: 0-NO PAIN

## 2024-09-16 NOTE — PATIENT INSTRUCTIONS
We will call you after we review the stress test, which you can schedule at your convenience.  Return visit in 1 year.

## 2024-09-17 ENCOUNTER — OFFICE VISIT (OUTPATIENT)
Dept: GASTROENTEROLOGY | Facility: CLINIC | Age: 69
End: 2024-09-17
Payer: MEDICARE

## 2024-09-17 VITALS
WEIGHT: 186 LBS | BODY MASS INDEX: 30.99 KG/M2 | HEIGHT: 65 IN | HEART RATE: 77 BPM | TEMPERATURE: 97.6 F | SYSTOLIC BLOOD PRESSURE: 147 MMHG | DIASTOLIC BLOOD PRESSURE: 84 MMHG

## 2024-09-17 DIAGNOSIS — Z12.11 SCREENING FOR MALIGNANT NEOPLASM OF COLON: Primary | ICD-10-CM

## 2024-09-17 PROCEDURE — 3079F DIAST BP 80-89 MM HG: CPT | Performed by: INTERNAL MEDICINE

## 2024-09-17 PROCEDURE — 1160F RVW MEDS BY RX/DR IN RCRD: CPT | Performed by: INTERNAL MEDICINE

## 2024-09-17 PROCEDURE — 99213 OFFICE O/P EST LOW 20 MIN: CPT | Performed by: INTERNAL MEDICINE

## 2024-09-17 PROCEDURE — 1159F MED LIST DOCD IN RCRD: CPT | Performed by: INTERNAL MEDICINE

## 2024-09-17 PROCEDURE — 3077F SYST BP >= 140 MM HG: CPT | Performed by: INTERNAL MEDICINE

## 2024-09-17 PROCEDURE — 3008F BODY MASS INDEX DOCD: CPT | Performed by: INTERNAL MEDICINE

## 2024-09-17 PROCEDURE — 1157F ADVNC CARE PLAN IN RCRD: CPT | Performed by: INTERNAL MEDICINE

## 2024-09-17 NOTE — PATIENT INSTRUCTIONS
Your risk of colon cancer is not elevated due to remote family history  I recommend we change back to Cologuard in 2025 or 2026

## 2024-09-17 NOTE — PROGRESS NOTES
Subjective   Patient ID: Nallely Neumann is a 69 y.o. female.    Here to discuss CRC screening    She has a tough colonoscopy due to sigmoid colon fixed loops  She had a very painful colonoscopy at [place]  She had 4 colonoscopies and none of them had polyps    She has adhesions and scar tissue  She has only second degree family members with CRC    Feels well  Bowels good  Using probiotic   BM each day  Healthy diet with fruit and veggies         Review of Systems    Objective   Physical Exam  Constitutional:       Appearance: Normal appearance.   Neurological:      Mental Status: She is alert.   Psychiatric:         Mood and Affect: Mood normal.         Behavior: Behavior normal.         Thought Content: Thought content normal.         Judgment: Judgment normal.         Assessment/Plan   Diagnoses and all orders for this visit:  Screening for malignant neoplasm of colon

## 2024-09-18 LAB
ATRIAL RATE: 68 BPM
P AXIS: 56 DEGREES
P OFFSET: 181 MS
P ONSET: 125 MS
PR INTERVAL: 168 MS
Q ONSET: 209 MS
QRS COUNT: 11 BEATS
QRS DURATION: 86 MS
QT INTERVAL: 390 MS
QTC CALCULATION(BAZETT): 414 MS
QTC FREDERICIA: 406 MS
R AXIS: -27 DEGREES
T AXIS: 23 DEGREES
T OFFSET: 404 MS
VENTRICULAR RATE: 68 BPM

## 2024-09-24 ENCOUNTER — HOSPITAL ENCOUNTER (OUTPATIENT)
Dept: CARDIOLOGY | Facility: CLINIC | Age: 69
Discharge: HOME | End: 2024-09-24
Payer: MEDICARE

## 2024-09-24 ENCOUNTER — PATIENT MESSAGE (OUTPATIENT)
Dept: CARDIOLOGY | Facility: CLINIC | Age: 69
End: 2024-09-24

## 2024-09-24 DIAGNOSIS — I10 BENIGN ESSENTIAL HYPERTENSION: ICD-10-CM

## 2024-09-24 DIAGNOSIS — E78.41 ELEVATED LIPOPROTEIN(A): ICD-10-CM

## 2024-09-24 DIAGNOSIS — R93.1 HIGH CORONARY ARTERY CALCIUM SCORE: ICD-10-CM

## 2024-09-24 PROCEDURE — 93018 CV STRESS TEST I&R ONLY: CPT | Performed by: INTERNAL MEDICINE

## 2024-09-24 PROCEDURE — 93016 CV STRESS TEST SUPVJ ONLY: CPT | Performed by: INTERNAL MEDICINE

## 2024-09-24 PROCEDURE — 93017 CV STRESS TEST TRACING ONLY: CPT

## 2024-10-24 ASSESSMENT — ENCOUNTER SYMPTOMS
NEUROLOGICAL NEGATIVE: 1
PSYCHIATRIC NEGATIVE: 1
RESPIRATORY NEGATIVE: 1
CARDIOVASCULAR NEGATIVE: 1
CONSTITUTIONAL NEGATIVE: 1

## 2024-10-25 ENCOUNTER — APPOINTMENT (OUTPATIENT)
Dept: SLEEP MEDICINE | Facility: CLINIC | Age: 69
End: 2024-10-25
Payer: MEDICARE

## 2024-10-25 VITALS
WEIGHT: 195 LBS | HEIGHT: 65 IN | SYSTOLIC BLOOD PRESSURE: 142 MMHG | DIASTOLIC BLOOD PRESSURE: 83 MMHG | HEART RATE: 102 BPM | BODY MASS INDEX: 32.49 KG/M2

## 2024-10-25 DIAGNOSIS — G47.33 OBSTRUCTIVE SLEEP APNEA: Primary | ICD-10-CM

## 2024-10-25 DIAGNOSIS — I10 BENIGN ESSENTIAL HYPERTENSION: ICD-10-CM

## 2024-10-25 PROCEDURE — 3077F SYST BP >= 140 MM HG: CPT | Performed by: STUDENT IN AN ORGANIZED HEALTH CARE EDUCATION/TRAINING PROGRAM

## 2024-10-25 PROCEDURE — 1036F TOBACCO NON-USER: CPT | Performed by: STUDENT IN AN ORGANIZED HEALTH CARE EDUCATION/TRAINING PROGRAM

## 2024-10-25 PROCEDURE — 95977 ALYS CPLX CN NPGT PRGRMG: CPT | Performed by: STUDENT IN AN ORGANIZED HEALTH CARE EDUCATION/TRAINING PROGRAM

## 2024-10-25 PROCEDURE — 99214 OFFICE O/P EST MOD 30 MIN: CPT | Performed by: STUDENT IN AN ORGANIZED HEALTH CARE EDUCATION/TRAINING PROGRAM

## 2024-10-25 PROCEDURE — 1160F RVW MEDS BY RX/DR IN RCRD: CPT | Performed by: STUDENT IN AN ORGANIZED HEALTH CARE EDUCATION/TRAINING PROGRAM

## 2024-10-25 PROCEDURE — G2211 COMPLEX E/M VISIT ADD ON: HCPCS | Performed by: STUDENT IN AN ORGANIZED HEALTH CARE EDUCATION/TRAINING PROGRAM

## 2024-10-25 PROCEDURE — 3008F BODY MASS INDEX DOCD: CPT | Performed by: STUDENT IN AN ORGANIZED HEALTH CARE EDUCATION/TRAINING PROGRAM

## 2024-10-25 PROCEDURE — 3079F DIAST BP 80-89 MM HG: CPT | Performed by: STUDENT IN AN ORGANIZED HEALTH CARE EDUCATION/TRAINING PROGRAM

## 2024-10-25 PROCEDURE — 1157F ADVNC CARE PLAN IN RCRD: CPT | Performed by: STUDENT IN AN ORGANIZED HEALTH CARE EDUCATION/TRAINING PROGRAM

## 2024-10-25 PROCEDURE — 1159F MED LIST DOCD IN RCRD: CPT | Performed by: STUDENT IN AN ORGANIZED HEALTH CARE EDUCATION/TRAINING PROGRAM

## 2024-10-25 ASSESSMENT — SLEEP AND FATIGUE QUESTIONNAIRES
DIFFICULTY_STAYING_ASLEEP: MILD
DIFFICULTY_FALLING_ASLEEP: MODERATE
HOW LIKELY ARE YOU TO NOD OFF OR FALL ASLEEP WHILE LYING DOWN TO REST IN THE AFTERNOON WHEN CIRCUMSTANCES PERMIT: SLIGHT CHANCE OF DOZING
HOW LIKELY ARE YOU TO NOD OFF OR FALL ASLEEP WHILE SITTING QUIETLY AFTER LUNCH WITHOUT ALCOHOL: WOULD NEVER DOZE
WAKING_TOO_EARLY: MODERATE
HOW LIKELY ARE YOU TO NOD OFF OR FALL ASLEEP WHILE SITTING AND READING: SLIGHT CHANCE OF DOZING
HOW LIKELY ARE YOU TO NOD OFF OR FALL ASLEEP WHILE SITTING AND TALKING TO SOMEONE: WOULD NEVER DOZE
HOW LIKELY ARE YOU TO NOD OFF OR FALL ASLEEP WHILE WATCHING TV: SLIGHT CHANCE OF DOZING
HOW LIKELY ARE YOU TO NOD OFF OR FALL ASLEEP WHEN YOU ARE A PASSENGER IN A CAR FOR AN HOUR WITHOUT A BREAK: WOULD NEVER DOZE
SLEEP_PROBLEM_NOTICEABLE_TO_OTHERS: SOMEWHAT
ESS-CHAD TOTAL SCORE: 3
HOW LIKELY ARE YOU TO NOD OFF OR FALL ASLEEP IN A CAR, WHILE STOPPED FOR A FEW MINUTES IN TRAFFIC: WOULD NEVER DOZE
SLEEP_PROBLEM_INTERFERES_DAILY_ACTIVITIES: A LITTLE
WORRIED_DISTRESSED_DUE_TO_SLEEP: A LITTLE
SITING INACTIVE IN A PUBLIC PLACE LIKE A CLASS ROOM OR A MOVIE THEATER: WOULD NEVER DOZE
SATISFACTION_WITH_CURRENT_SLEEP_PATTERN: SATISFIED

## 2024-10-25 NOTE — ASSESSMENT & PLAN NOTE
BP Readings from Last 1 Encounters:   10/25/24 142/83     - doing well, asymptomatic  - discussed at length the impact of untreated HUMAIRA and BP control  - continue current management and follow-up with PCP

## 2024-10-25 NOTE — ASSESSMENT & PLAN NOTE
"- History of sleep apnea, moderate-severe. She is s/p Inspire implantation in 2016 with Dr. Baez. She was initially followed by Dr. Avila and then by Dr. Gusman. She continues to follow with Dr. Gusman and Dr. Faustin for her insomnia and with me for her HUMAIRA therapy.  - She had two pre op sleep tests- HST in  showing AHI 45 and PSG in 2016 (pt notes \"Study was a nightmare\") that showed AHI ~20.   - When she was activated in 2016, FT was 2.2 at default settings, She eventually stepped up to 3.1 at which she had HST, AHI was 10 on that study  - in  she continued to step up to as high as 3.8V thereafter  - we turned it down to 1.8 - 2.7 (@ 2.1 v: LV 4) at last visit on 2022  - she is now using it about 4.5 hrs a night, with multiple pauses    Analysis on 2024  - outgoing setting at 2.0-2.4 (@ 2.3v)  -  pause time changed to 10 min, start delay changed to 35 min  - encourage to use it more    Analysis on 10/24/2024  ST: 1.8  FT: 2.3  Outgoing settin.2-2.6 (@2.4v)  Pause time remains at 10 min, start delay remains at 35 min  Encourage usage extension    "

## 2024-10-25 NOTE — ASSESSMENT & PLAN NOTE
BMI Readings from Last 1 Encounters:   10/25/24 32.45 kg/m²     - encouraged healthy weight loss via diet and exercise

## 2024-10-25 NOTE — PROGRESS NOTES
" Patient: Nallely Neumann    [id number]  : 1955 -- AGE 69 y.o.    Provider: Ander Gonzalez MD     Location [facility]   Service Date: 10/25/2024              [facility]  Followup Visit Note    ASSESSMENT/PLAN     Ms. Neumann is a 69 y.o. female and she returns in followup to the [facility] for the problems listed below on 10/25/24     Problem List, Orders, Assessment, Recommendations:  Problem List Items Addressed This Visit             ICD-10-CM    Benign essential hypertension I10     BP Readings from Last 1 Encounters:   10/25/24 142/83     - doing well, asymptomatic  - discussed at length the impact of untreated HUMAIRA and BP control  - continue current management and follow-up with PCP           Obstructive sleep apnea - Primary G47.33     - History of sleep apnea, moderate-severe. She is s/p Inspire implantation in 2016 with Dr. Baez. She was initially followed by Dr. Avila and then by Dr. Gusman. She continues to follow with Dr. Gusman and Dr. Faustin for her insomnia and with me for her HUMAIRA therapy.  - She had two pre op sleep tests- HST in  showing AHI 45 and PSG in 2016 (pt notes \"Study was a nightmare\") that showed AHI ~20.   - When she was activated in 2016, FT was 2.2 at default settings, She eventually stepped up to 3.1 at which she had HST, AHI was 10 on that study  - in  she continued to step up to as high as 3.8V thereafter  - we turned it down to 1.8 - 2.7 (@ 2.1 v: LV 4) at last visit on 2022  - she is now using it about 4.5 hrs a night, with multiple pauses    Analysis on 2024  - outgoing setting at 2.0-2.4 (@ 2.3v)  -  pause time changed to 10 min, start delay changed to 35 min  - encourage to use it more    Analysis on 10/24/2024  ST: 1.8  FT: 2.3  Outgoing settin.2-2.6 (@2.4v)  Pause time remains at 10 min, start delay remains at 35 min  Encourage usage extension           " "Relevant Orders    Follow Up In Adult Sleep Medicine    BMI 32.0-32.9,adult Z68.32     BMI Readings from Last 1 Encounters:   10/25/24 32.45 kg/m²     - encouraged healthy weight loss via diet and exercise              Disposition    Return to clinic in 6 months         HISTORY OF PRESENT ILLNESS     HISTORY OF PRESENT ILLNESS   Nallely Neumann is a 69 y.o. female with h/o Hypertension, HUMAIRA, and Obesity who presents to a [facility] for followup.     Assessment and plan from last visit: 4/19/2024    Ms. Neumann is a 68 y.o. female and she returns in followup to the [facility] for HUMAIRA.     Problem List, Orders, Assessment, Recommendations:  Problem List Items Addressed This Visit               ICD-10-CM     Benign essential hypertension I10           BP Readings from Last 1 Encounters:   04/19/24 134/82      - doing well, asymptomatic  - discussed at length the impact of untreated HUMAIRA and BP control  - continue current management and follow-up with PCP            Obstructive sleep apnea - Primary G47.33       - History of sleep apnea, moderate-severe. She is s/p Inspire implantation in February 2016 with Dr. Baez. She was initially followed by Dr. Avila and then by Dr. Gusman. She continues to follow with Dr. Gusman and Dr. Faustin for her insomnia and with me for her HUMAIRA therapy.  - She had two pre op sleep tests- HST in 2014 showing AHI 45 and PSG in 2016 Jan (pt notes \"Study was a nightmare\") that showed AHI ~20.   - When she was activated in April 2016, FT was 2.2 at default settings, She eventually stepped up to 3.1 at which she had HST, AHI was 10 on that study  - in 2022 she continued to step up to as high as 3.8V thereafter  - we turned it down to 1.8 - 2.7 (@ 2.1 v: LV 4) at last visit on 12/2022  - she is now using it about 4.5 hrs a night, with multiple pauses     today setting:    - outgoing setting at 2.0-2.4 (@ 2.3v)  -  pause time changed " to 10 min, start delay changed to 35 min   - encourage to use it more           BMI 31.0-31.9,adult Z68.31       - current BMI at ~       BMI Readings from Last 1 Encounters:   04/19/24 31.95 kg/m²      - encouraged healthy weight loss via diet and exercise  - consider referral to the weight loss program at follow-up visit                  Disposition     Return to clinic in 6 months    Current History    On today's visit, the patient reports that she has been doing well with INSPIRe, though usage has not increased that much.  Remote was unable to provide me download today.  We suggested her to switched to the back-up remote that she bought years ago.  She is also dealing with some over-active bladder issue.  Her frequency has increased not only during the night but also during the day.  She is seeing her primary and OBGYN provider soon.    RLS Followup:   none.    Daytime Symptoms    Patient reports DAYTIME SYMPTOMS: no daytime symptoms  Patient denies daytime symptoms including: Denies: excessive daytime sleepiness    Naps: No  Fatigue: denies feeling fatigue    ESS: 3  NOE: 10  FOSQ: 37    REVIEW OF SYSTEMS     REVIEW OF SYSTEMS  Review of Systems   Constitutional: Negative.    HENT: Negative.     Respiratory: Negative.     Cardiovascular: Negative.    Genitourinary: Negative.    Skin: Negative.    Neurological: Negative.    Psychiatric/Behavioral: Negative.           ALLERGIES AND MEDICATIONS     ALLERGIES  Allergies   Allergen Reactions    Bee Pollen Unknown    House Dust Unknown    Ragweed Unknown       MEDICATIONS: She has a current medication list which includes the following prescription(s): alclometasone - Apply topically 2 times a day. to affected area, amlodipine - TAKE ONE TABLET BY MOUTH DAILY, aspirin - Take by mouth, calcium-d3-zinc-copper-arnol, cholecalciferol-soy isoflavone, repatha sureclick - INJECT 140 MG (1 PEN) UNDER THE SKIN ONCE EVERY 2 WEEKS AS DIRECTED, ezetimibe - TAKE ONE TABLET BY  "MOUTH DAILY, fexofenadine - TAKE 1 TABLET DAILY AS NEEDED FOR ALLERGIES by mouth, fluticasone - Administer 1 spray into each nostril once daily, culturelle - 1 capsule 2 times a day, montelukast - TAKE ONE TABLET BY MOUTH ONCE DAILY, multivitamin with minerals iron-free - Take 1 tablet by mouth once daily, ramipril - TAKE ONE CAPSULE BY MOUTH TWO TIMES A DAY, rosuvastatin - TAKE ONE TABLET BY MOUTH ONCE DAILY, and zolpidem - TAKE ONE-HALF TABLET BY MOUTH ONCE DAILY AT BEDTIME.    PAST MEDICAL HISTORY : She  has a past medical history of Acute laryngitis (05/01/2023), Allergic (1978), Dorsalgia, unspecified, Heart disease (2011), Hypertension (1998), Insomnia, unspecified, Laceration of finger (05/03/2024), Otitis externa; acute (05/01/2023), Parasomnia, unspecified (11/02/2017), Personal history of other diseases of the circulatory system (06/23/2016), Personal history of other diseases of the nervous system and sense organs, Pure hypercholesterolemia, unspecified (06/22/2016), and Pure hyperglyceridemia.    PAST SURGICAL HISTORY: She  has a past surgical history that includes Other surgical history (07/06/2017); Other surgical history (06/19/2020); Incisional breast biopsy (12/02/2015); and Other surgical history (04/05/2016).     FAMILY HISTORY: No changes since previous visit. Otherwise non-contributory as charted.     SOCIAL HISTORY  She  reports that she has never smoked. She has never used smokeless tobacco. She reports current alcohol use of about 6.0 standard drinks of alcohol per week. She reports that she does not use drugs.       PHYSICAL EXAM     VITAL SIGNS: /83 (BP Location: Right arm, Patient Position: Sitting, BP Cuff Size: Adult)   Pulse 102   Ht 1.651 m (5' 5\")   Wt 88.5 kg (195 lb)   BMI 32.45 kg/m²      PREVIOUS WEIGHTS:  Wt Readings from Last 3 Encounters:   10/25/24 88.5 kg (195 lb)   09/17/24 84.4 kg (186 lb)   09/16/24 84.9 kg (187 lb 3 oz)         RESULTS/DATA     Bicarbonate " (mmol/L)   Date Value   05/09/2024 30   11/07/2023 29   05/04/2023 30   10/17/2022 31   04/13/2022 29

## 2024-10-31 DIAGNOSIS — Z79.899 MEDICATION MANAGEMENT: ICD-10-CM

## 2024-10-31 DIAGNOSIS — J30.9 ALLERGIC RHINITIS, UNSPECIFIED SEASONALITY, UNSPECIFIED TRIGGER: ICD-10-CM

## 2024-10-31 DIAGNOSIS — F51.04 CHRONIC INSOMNIA: ICD-10-CM

## 2024-10-31 RX ORDER — FLUTICASONE PROPIONATE 50 MCG
1 SPRAY, SUSPENSION (ML) NASAL DAILY
Qty: 16 G | Refills: 3 | Status: SHIPPED | OUTPATIENT
Start: 2024-10-31

## 2024-10-31 RX ORDER — ZOLPIDEM TARTRATE 5 MG/1
TABLET ORAL
Qty: 45 TABLET | Refills: 0 | Status: SHIPPED | OUTPATIENT
Start: 2024-10-31

## 2024-11-05 ENCOUNTER — APPOINTMENT (OUTPATIENT)
Dept: OBSTETRICS AND GYNECOLOGY | Facility: CLINIC | Age: 69
End: 2024-11-05
Payer: MEDICARE

## 2024-11-12 ENCOUNTER — APPOINTMENT (OUTPATIENT)
Dept: PRIMARY CARE | Facility: CLINIC | Age: 69
End: 2024-11-12
Payer: MEDICARE

## 2024-11-12 VITALS
SYSTOLIC BLOOD PRESSURE: 138 MMHG | OXYGEN SATURATION: 98 % | DIASTOLIC BLOOD PRESSURE: 82 MMHG | HEART RATE: 66 BPM | WEIGHT: 192.8 LBS | BODY MASS INDEX: 32.08 KG/M2

## 2024-11-12 DIAGNOSIS — E78.41 ELEVATED LIPOPROTEIN(A): ICD-10-CM

## 2024-11-12 DIAGNOSIS — E78.01 FAMILIAL HYPERCHOLESTEROLEMIA: ICD-10-CM

## 2024-11-12 DIAGNOSIS — Z00.00 ROUTINE GENERAL MEDICAL EXAMINATION AT HEALTH CARE FACILITY: Primary | ICD-10-CM

## 2024-11-12 DIAGNOSIS — I10 BENIGN ESSENTIAL HYPERTENSION: ICD-10-CM

## 2024-11-12 PROBLEM — F41.8 ANXIETY ABOUT HEALTH: Status: RESOLVED | Noted: 2023-05-01 | Resolved: 2024-11-12

## 2024-11-12 PROBLEM — E87.5 HYPERKALEMIA: Status: RESOLVED | Noted: 2023-05-01 | Resolved: 2024-11-12

## 2024-11-12 PROBLEM — R92.30 BREAST DENSITY: Status: RESOLVED | Noted: 2023-05-01 | Resolved: 2024-11-12

## 2024-11-12 PROBLEM — J34.89 NASAL VESTIBULITIS: Status: RESOLVED | Noted: 2023-05-01 | Resolved: 2024-11-12

## 2024-11-12 PROBLEM — R63.4 WEIGHT REDUCTION: Status: RESOLVED | Noted: 2023-05-01 | Resolved: 2024-11-12

## 2024-11-12 PROBLEM — G47.9 DIFFICULTY SLEEPING: Status: RESOLVED | Noted: 2024-05-03 | Resolved: 2024-11-12

## 2024-11-12 PROCEDURE — 1170F FXNL STATUS ASSESSED: CPT | Performed by: FAMILY MEDICINE

## 2024-11-12 PROCEDURE — G0439 PPPS, SUBSEQ VISIT: HCPCS | Performed by: FAMILY MEDICINE

## 2024-11-12 PROCEDURE — 1157F ADVNC CARE PLAN IN RCRD: CPT | Performed by: FAMILY MEDICINE

## 2024-11-12 PROCEDURE — 1160F RVW MEDS BY RX/DR IN RCRD: CPT | Performed by: FAMILY MEDICINE

## 2024-11-12 PROCEDURE — 3079F DIAST BP 80-89 MM HG: CPT | Performed by: FAMILY MEDICINE

## 2024-11-12 PROCEDURE — 3075F SYST BP GE 130 - 139MM HG: CPT | Performed by: FAMILY MEDICINE

## 2024-11-12 PROCEDURE — 1036F TOBACCO NON-USER: CPT | Performed by: FAMILY MEDICINE

## 2024-11-12 PROCEDURE — 1159F MED LIST DOCD IN RCRD: CPT | Performed by: FAMILY MEDICINE

## 2024-11-12 PROCEDURE — 99214 OFFICE O/P EST MOD 30 MIN: CPT | Performed by: FAMILY MEDICINE

## 2024-11-12 ASSESSMENT — PATIENT HEALTH QUESTIONNAIRE - PHQ9
1. LITTLE INTEREST OR PLEASURE IN DOING THINGS: NOT AT ALL
SUM OF ALL RESPONSES TO PHQ9 QUESTIONS 1 AND 2: 0
2. FEELING DOWN, DEPRESSED OR HOPELESS: NOT AT ALL

## 2024-11-12 ASSESSMENT — ENCOUNTER SYMPTOMS
UNEXPECTED WEIGHT CHANGE: 0
APPETITE CHANGE: 0
NAUSEA: 0

## 2024-11-12 ASSESSMENT — ACTIVITIES OF DAILY LIVING (ADL)
MANAGING_FINANCES: INDEPENDENT
BATHING: INDEPENDENT
DRESSING: INDEPENDENT
TAKING_MEDICATION: INDEPENDENT
DOING_HOUSEWORK: INDEPENDENT
GROCERY_SHOPPING: INDEPENDENT

## 2024-11-12 NOTE — PROGRESS NOTES
Subjective   Patient ID: Nallely Neumann is a 69 y.o. female who presents for Follow-up (6 month, pt has sleep apnea and using the inspire and wakes up needing to use the bathroom more often.  Would like to discuss if she might have overactive bladder and if any meds could be causing this.  PT would like to discuss female hormones. Pt wonders if she should be concerned about her blood sugars.  Referral for podiatrist.) and Medicare Annual Wellness Visit Subsequent.    HPI   Patient has hx of stable hypertension, hyperlipidemia.  Pt denies chest pain, shortness of breath and edema.  Patient's current treatment as listed in Rx.  Patient is compliant with treatment and complains of no side effects associated treatment.    Review of Systems   Constitutional:  Negative for appetite change and unexpected weight change.   Eyes:  Negative for visual disturbance.   Gastrointestinal:  Negative for nausea.       Objective   /82   Pulse 66   Wt 87.5 kg (192 lb 12.8 oz)   SpO2 98%   BMI 32.08 kg/m²     Physical Exam  HENT:      Head: Normocephalic and atraumatic.      Nose: Nose normal.      Mouth/Throat:      Mouth: Mucous membranes are moist.      Pharynx: No oropharyngeal exudate.   Eyes:      Extraocular Movements: Extraocular movements intact.      Conjunctiva/sclera: Conjunctivae normal.      Pupils: Pupils are equal, round, and reactive to light.   Cardiovascular:      Rate and Rhythm: Normal rate and regular rhythm.   Pulmonary:      Effort: Pulmonary effort is normal.      Breath sounds: Normal breath sounds.   Abdominal:      General: There is no distension.      Palpations: Abdomen is soft.   Musculoskeletal:      Cervical back: Normal range of motion and neck supple.   Lymphadenopathy:      Cervical: No cervical adenopathy.   Neurological:      General: No focal deficit present.      Mental Status: She is alert.   Psychiatric:         Attention and Perception: Attention normal.         Speech: Speech normal.          Behavior: Behavior is cooperative.         Assessment/Plan   Problem List Items Addressed This Visit             ICD-10-CM    Benign essential hypertension I10     Treated and controlled         Relevant Orders    CT cardiac scoring wo IV contrast    Lipid Panel    CBC and Auto Differential    Comprehensive Metabolic Panel    Elevated lipoprotein(a) E78.41    Familial hypercholesterolemia E78.01     Continue statin  Follow Mediterranean diet and stay active         Relevant Orders    Lipid Panel    CBC and Auto Differential    Comprehensive Metabolic Panel     Other Visit Diagnoses         Codes    Routine general medical examination at health care facility    -  Primary Z00.00    Relevant Orders    1 Year Follow Up In Primary Care - Wellness Exam        ARMAND LM discussed  Reviewed labs  Reviewed cardiology note  Recheck here 3 months sooner if any issues arise

## 2024-11-15 ENCOUNTER — LAB (OUTPATIENT)
Dept: LAB | Facility: LAB | Age: 69
End: 2024-11-15
Payer: MEDICARE

## 2024-11-15 DIAGNOSIS — I10 BENIGN ESSENTIAL HYPERTENSION: ICD-10-CM

## 2024-11-15 DIAGNOSIS — E78.01 FAMILIAL HYPERCHOLESTEROLEMIA: ICD-10-CM

## 2024-11-15 LAB
ALBUMIN SERPL BCP-MCNC: 4.5 G/DL (ref 3.4–5)
ALP SERPL-CCNC: 75 U/L (ref 33–136)
ALT SERPL W P-5'-P-CCNC: 23 U/L (ref 7–45)
ANION GAP SERPL CALC-SCNC: 14 MMOL/L (ref 10–20)
AST SERPL W P-5'-P-CCNC: 18 U/L (ref 9–39)
BASOPHILS # BLD AUTO: 0.07 X10*3/UL (ref 0–0.1)
BASOPHILS NFR BLD AUTO: 1.3 %
BILIRUB SERPL-MCNC: 0.6 MG/DL (ref 0–1.2)
BUN SERPL-MCNC: 15 MG/DL (ref 6–23)
CALCIUM SERPL-MCNC: 9.8 MG/DL (ref 8.6–10.3)
CHLORIDE SERPL-SCNC: 99 MMOL/L (ref 98–107)
CHOLEST SERPL-MCNC: 146 MG/DL (ref 0–199)
CHOLESTEROL/HDL RATIO: 1.5
CO2 SERPL-SCNC: 31 MMOL/L (ref 21–32)
CREAT SERPL-MCNC: 0.76 MG/DL (ref 0.5–1.05)
EGFRCR SERPLBLD CKD-EPI 2021: 84 ML/MIN/1.73M*2
EOSINOPHIL # BLD AUTO: 0.24 X10*3/UL (ref 0–0.4)
EOSINOPHIL NFR BLD AUTO: 4.5 %
ERYTHROCYTE [DISTWIDTH] IN BLOOD BY AUTOMATED COUNT: 12.7 % (ref 11.5–14.5)
GLUCOSE SERPL-MCNC: 102 MG/DL (ref 74–99)
HCT VFR BLD AUTO: 47.6 % (ref 36–46)
HDLC SERPL-MCNC: 96.6 MG/DL
HGB BLD-MCNC: 15 G/DL (ref 12–16)
IMM GRANULOCYTES # BLD AUTO: 0.03 X10*3/UL (ref 0–0.5)
IMM GRANULOCYTES NFR BLD AUTO: 0.6 % (ref 0–0.9)
LDLC SERPL CALC-MCNC: 39 MG/DL
LYMPHOCYTES # BLD AUTO: 1.33 X10*3/UL (ref 0.8–3)
LYMPHOCYTES NFR BLD AUTO: 25.1 %
MCH RBC QN AUTO: 26.6 PG (ref 26–34)
MCHC RBC AUTO-ENTMCNC: 31.5 G/DL (ref 32–36)
MCV RBC AUTO: 84 FL (ref 80–100)
MONOCYTES # BLD AUTO: 0.52 X10*3/UL (ref 0.05–0.8)
MONOCYTES NFR BLD AUTO: 9.8 %
NEUTROPHILS # BLD AUTO: 3.1 X10*3/UL (ref 1.6–5.5)
NEUTROPHILS NFR BLD AUTO: 58.7 %
NON HDL CHOLESTEROL: 49 MG/DL (ref 0–149)
NRBC BLD-RTO: 0 /100 WBCS (ref 0–0)
PLATELET # BLD AUTO: 367 X10*3/UL (ref 150–450)
POTASSIUM SERPL-SCNC: 4.7 MMOL/L (ref 3.5–5.3)
PROT SERPL-MCNC: 6.8 G/DL (ref 6.4–8.2)
RBC # BLD AUTO: 5.64 X10*6/UL (ref 4–5.2)
SODIUM SERPL-SCNC: 139 MMOL/L (ref 136–145)
TRIGL SERPL-MCNC: 54 MG/DL (ref 0–149)
VLDL: 11 MG/DL (ref 0–40)
WBC # BLD AUTO: 5.3 X10*3/UL (ref 4.4–11.3)

## 2024-11-15 PROCEDURE — 36415 COLL VENOUS BLD VENIPUNCTURE: CPT

## 2024-11-15 PROCEDURE — 80053 COMPREHEN METABOLIC PANEL: CPT

## 2024-11-15 PROCEDURE — 80061 LIPID PANEL: CPT

## 2024-11-15 PROCEDURE — 85025 COMPLETE CBC W/AUTO DIFF WBC: CPT

## 2024-12-06 ENCOUNTER — APPOINTMENT (OUTPATIENT)
Dept: OBSTETRICS AND GYNECOLOGY | Facility: CLINIC | Age: 69
End: 2024-12-06
Payer: MEDICARE

## 2024-12-06 VITALS
SYSTOLIC BLOOD PRESSURE: 120 MMHG | DIASTOLIC BLOOD PRESSURE: 60 MMHG | BODY MASS INDEX: 32.61 KG/M2 | HEIGHT: 64 IN | WEIGHT: 191 LBS

## 2024-12-06 DIAGNOSIS — Z12.31 ENCOUNTER FOR SCREENING MAMMOGRAM FOR BREAST CANCER: ICD-10-CM

## 2024-12-06 DIAGNOSIS — R92.333 HETEROGENEOUSLY DENSE TISSUE OF BOTH BREASTS ON MAMMOGRAPHY: ICD-10-CM

## 2024-12-06 DIAGNOSIS — R39.15 URINARY URGENCY: Primary | ICD-10-CM

## 2024-12-06 LAB
POC APPEARANCE, URINE: CLEAR
POC BILIRUBIN, URINE: NEGATIVE
POC BLOOD, URINE: ABNORMAL
POC COLOR, URINE: YELLOW
POC GLUCOSE, URINE: NEGATIVE MG/DL
POC KETONES, URINE: NEGATIVE MG/DL
POC LEUKOCYTES, URINE: ABNORMAL
POC NITRITE,URINE: NEGATIVE
POC PH, URINE: 5 PH
POC PROTEIN, URINE: NEGATIVE MG/DL
POC SPECIFIC GRAVITY, URINE: 1.01
POC UROBILINOGEN, URINE: 0.2 EU/DL

## 2024-12-06 PROCEDURE — 1160F RVW MEDS BY RX/DR IN RCRD: CPT | Performed by: OBSTETRICS & GYNECOLOGY

## 2024-12-06 PROCEDURE — 3008F BODY MASS INDEX DOCD: CPT | Performed by: OBSTETRICS & GYNECOLOGY

## 2024-12-06 PROCEDURE — 3074F SYST BP LT 130 MM HG: CPT | Performed by: OBSTETRICS & GYNECOLOGY

## 2024-12-06 PROCEDURE — 81003 URINALYSIS AUTO W/O SCOPE: CPT | Performed by: OBSTETRICS & GYNECOLOGY

## 2024-12-06 PROCEDURE — 99214 OFFICE O/P EST MOD 30 MIN: CPT | Performed by: OBSTETRICS & GYNECOLOGY

## 2024-12-06 PROCEDURE — 1157F ADVNC CARE PLAN IN RCRD: CPT | Performed by: OBSTETRICS & GYNECOLOGY

## 2024-12-06 PROCEDURE — 1159F MED LIST DOCD IN RCRD: CPT | Performed by: OBSTETRICS & GYNECOLOGY

## 2024-12-06 PROCEDURE — 87086 URINE CULTURE/COLONY COUNT: CPT

## 2024-12-06 PROCEDURE — 3078F DIAST BP <80 MM HG: CPT | Performed by: OBSTETRICS & GYNECOLOGY

## 2024-12-06 RX ORDER — TAMSULOSIN HYDROCHLORIDE 0.4 MG/1
0.4 CAPSULE ORAL DAILY
Qty: 90 CAPSULE | Refills: 1 | Status: SHIPPED | OUTPATIENT
Start: 2024-12-06

## 2024-12-06 NOTE — PROGRESS NOTES
ASSESSMENT/PLAN  1. Urinary urgency (Primary)  - tamsulosin (Flomax) 0.4 mg 24 hr capsule; Take 1 capsule (0.4 mg) by mouth once daily.  Dispense: 90 capsule; Refill: 1  - POCT UA Automated manually resulted  - Urine culture    2. Heterogeneously dense tissue of both breasts on mammograph  3. Encounter for screening mammogram for breast cancer  Your clinical breast exam was normal.   A screening mammogram order has been placed for 2/2025.       SUBJECTIVE    PAP 6-14-19 neg hpv neg   MAMMO 2-19-24   DEXA 2018   COLON 2/2023     HPI    70 yo here for breast and GYN exam.   Last visit 11/2023.   Dense breasts on imaging. Last mammogram 2/2024.   Denies any major intervening medical or surgical issues.   postmenopausal since age 51. No bleeding.  Questions about urinary urgency and frequency. Gets up 3-4 times per night. Does drink a good amount of water. Sleeps with mouth open.  on Flomax. Pt asks about trying that. Does not want something that dries her mouth.   Still dealing with sleep issues. - implantable device Inspire placed 2016 working well. Continues to see Dr. Quinonez.   , nonsmoker, 1 ETOH / day.    REVIEW OF SYSTEMS    Constitutional: no recent weight gain, no fatigue.   ENT: no hearing loss.  Cardiovascular: no chest pain, no palpitations.  Respiratory: no shortness of breath.  Gastrointestinal: no abdominal pain, no constipation, no nausea, no diarrhea.  Genitourinary: no pelvic pain, no dysuria, no urinary incontinence, no change in urinary frequency, no vaginal dryness, no vaginal itching,  no vaginal discharge, no unexplained vaginal bleeding, no lesion/sore.   Musculoskeletal: no back pain, no joint swelling, no leg edema.   Integumentary: no rashes, no skin lesions, no breast pain, no nipple discharge, no breast lump.   Neurological: no headache, no numbness, no dizziness.   Psychiatric: no sleep disturbances, no anxiety, no depression.   Endocrine: no hot flashes, no loss of hair, no  "hirsutism.   Hematologic/Lymphatic: no swollen glands.    OBJECTIVE    /60   Ht 1.626 m (5' 4\")   Wt 86.6 kg (191 lb)   BMI 32.79 kg/m²     Physical Exam     Constitutional: Alert and in no acute distress. Well developed, well nourished   Head and Face: Head and face: normal   Eyes: Normal external exam - nonicteric sclera.  Ears, Nose, Mouth, and Throat: External inspection of ears and nose: normal   Neck: no neck asymmetry. Supple and thyroid not enlarged and there were no palpable thyroid nodules   Cardiovascular: Heart rate and rhythm were normal  Pulmonary: No respiratory distress and clear bilateral breath sounds   Chest: Breasts: normal appearance, no nipple discharge, no skin changes. Palpation of breasts and axillae: no palpable mass, no axillary lymphadenopathy   Abdomen: soft nontender; no abdominal mass palpated, no organomegaly and no hernias   Genitourinary: external genitalia: normal appearing vulva and labia without lesions. No inguinal lymphadenopathy,    Urethra: normal.   Bladder: normal on palpation.   Perianal area: normal   Vagina: normal, without significant discharge, no lesions.  Cervix: Normal appearing without lesions.  Uterus: Normal, mobile, nontender.  Right and left adnexa/parametria: Normal  Skin: normal skin color and pigmentation, normal skin turgor, and no rash  Psychiatric: alert and oriented x 3., affect normal to patient baseline and mood: appropriate        Destiny Lake MD    "

## 2024-12-08 LAB — BACTERIA UR CULT: NORMAL

## 2024-12-09 DIAGNOSIS — E78.01 FAMILIAL HYPERCHOLESTEROLEMIA: ICD-10-CM

## 2024-12-09 RX ORDER — ROSUVASTATIN CALCIUM 40 MG/1
TABLET, COATED ORAL
Qty: 90 TABLET | Refills: 3 | Status: SHIPPED | OUTPATIENT
Start: 2024-12-09

## 2024-12-09 RX ORDER — EZETIMIBE 10 MG/1
TABLET ORAL
Qty: 90 TABLET | Refills: 3 | Status: SHIPPED | OUTPATIENT
Start: 2024-12-09

## 2024-12-10 ENCOUNTER — TELEPHONE (OUTPATIENT)
Dept: OBSTETRICS AND GYNECOLOGY | Facility: CLINIC | Age: 69
End: 2024-12-10
Payer: MEDICARE

## 2025-02-03 DIAGNOSIS — F51.04 CHRONIC INSOMNIA: ICD-10-CM

## 2025-02-03 DIAGNOSIS — Z79.899 MEDICATION MANAGEMENT: ICD-10-CM

## 2025-02-03 RX ORDER — ZOLPIDEM TARTRATE 5 MG/1
TABLET ORAL
Qty: 45 TABLET | Refills: 0 | Status: SHIPPED | OUTPATIENT
Start: 2025-02-03

## 2025-02-20 ENCOUNTER — APPOINTMENT (OUTPATIENT)
Dept: RADIOLOGY | Facility: CLINIC | Age: 70
End: 2025-02-20
Payer: MEDICARE

## 2025-02-26 ENCOUNTER — HOSPITAL ENCOUNTER (OUTPATIENT)
Dept: RADIOLOGY | Facility: CLINIC | Age: 70
Discharge: HOME | End: 2025-02-26
Payer: MEDICARE

## 2025-02-26 DIAGNOSIS — Z12.31 ENCOUNTER FOR SCREENING MAMMOGRAM FOR BREAST CANCER: ICD-10-CM

## 2025-02-26 PROCEDURE — 77063 BREAST TOMOSYNTHESIS BI: CPT | Performed by: STUDENT IN AN ORGANIZED HEALTH CARE EDUCATION/TRAINING PROGRAM

## 2025-02-26 PROCEDURE — 77067 SCR MAMMO BI INCL CAD: CPT

## 2025-02-26 PROCEDURE — 77067 SCR MAMMO BI INCL CAD: CPT | Performed by: STUDENT IN AN ORGANIZED HEALTH CARE EDUCATION/TRAINING PROGRAM

## 2025-02-27 DIAGNOSIS — E78.41 ELEVATED LIPOPROTEIN(A): ICD-10-CM

## 2025-02-27 DIAGNOSIS — E78.01 FAMILIAL HYPERCHOLESTEROLEMIA: ICD-10-CM

## 2025-02-27 RX ORDER — EVOLOCUMAB 140 MG/ML
INJECTION, SOLUTION SUBCUTANEOUS
Qty: 6 ML | Refills: 3 | Status: SHIPPED | OUTPATIENT
Start: 2025-02-27 | End: 2026-02-26

## 2025-03-03 ENCOUNTER — HOSPITAL ENCOUNTER (OUTPATIENT)
Dept: RADIOLOGY | Facility: HOSPITAL | Age: 70
Discharge: HOME | End: 2025-03-03
Payer: MEDICARE

## 2025-03-03 DIAGNOSIS — I10 BENIGN ESSENTIAL HYPERTENSION: ICD-10-CM

## 2025-03-03 PROCEDURE — 75571 CT HRT W/O DYE W/CA TEST: CPT

## 2025-03-04 ENCOUNTER — TELEPHONE (OUTPATIENT)
Dept: PRIMARY CARE | Facility: CLINIC | Age: 70
End: 2025-03-04
Payer: MEDICARE

## 2025-03-04 DIAGNOSIS — R91.1 SOLITARY PULMONARY NODULE PRESENT ON COMPUTED TOMOGRAPHY OF LUNG: Primary | ICD-10-CM

## 2025-03-04 DIAGNOSIS — R91.1 LUNG NODULE: ICD-10-CM

## 2025-03-04 NOTE — TELEPHONE ENCOUNTER
Spoke with pt and informed her that the orders per Dr. Santos have been placed.  Our office was unable to schedule a pulmonology appt but pt was given the central scheduling number to call.

## 2025-03-04 NOTE — TELEPHONE ENCOUNTER
----- Message from Pino Santos sent at 3/4/2025 12:50 PM EST -----  Please order repeat CT chest no contrast to be done in 3 months  Also order a pulmonology consult and get soonest available in the system  pls let patient know

## 2025-03-06 ENCOUNTER — OFFICE VISIT (OUTPATIENT)
Dept: PULMONOLOGY | Facility: CLINIC | Age: 70
End: 2025-03-06
Payer: MEDICARE

## 2025-03-06 VITALS
HEART RATE: 96 BPM | DIASTOLIC BLOOD PRESSURE: 93 MMHG | WEIGHT: 194.6 LBS | SYSTOLIC BLOOD PRESSURE: 157 MMHG | BODY MASS INDEX: 33.4 KG/M2 | OXYGEN SATURATION: 97 % | RESPIRATION RATE: 16 BRPM

## 2025-03-06 DIAGNOSIS — R91.1 LUNG NODULE SEEN ON IMAGING STUDY: ICD-10-CM

## 2025-03-06 DIAGNOSIS — D38.1 NEOPLASM OF UNCERTAIN BEHAVIOR OF LUNG: ICD-10-CM

## 2025-03-06 DIAGNOSIS — R91.1 LUNG NODULE: ICD-10-CM

## 2025-03-06 PROCEDURE — 1126F AMNT PAIN NOTED NONE PRSNT: CPT | Performed by: INTERNAL MEDICINE

## 2025-03-06 PROCEDURE — 1036F TOBACCO NON-USER: CPT | Performed by: INTERNAL MEDICINE

## 2025-03-06 PROCEDURE — 99214 OFFICE O/P EST MOD 30 MIN: CPT | Performed by: INTERNAL MEDICINE

## 2025-03-06 PROCEDURE — 3080F DIAST BP >= 90 MM HG: CPT | Performed by: INTERNAL MEDICINE

## 2025-03-06 PROCEDURE — 99204 OFFICE O/P NEW MOD 45 MIN: CPT | Performed by: INTERNAL MEDICINE

## 2025-03-06 PROCEDURE — 3077F SYST BP >= 140 MM HG: CPT | Performed by: INTERNAL MEDICINE

## 2025-03-06 PROCEDURE — 1159F MED LIST DOCD IN RCRD: CPT | Performed by: INTERNAL MEDICINE

## 2025-03-06 PROCEDURE — 1157F ADVNC CARE PLAN IN RCRD: CPT | Performed by: INTERNAL MEDICINE

## 2025-03-06 ASSESSMENT — ENCOUNTER SYMPTOMS: DEPRESSION: 0

## 2025-03-06 ASSESSMENT — COLUMBIA-SUICIDE SEVERITY RATING SCALE - C-SSRS
6. HAVE YOU EVER DONE ANYTHING, STARTED TO DO ANYTHING, OR PREPARED TO DO ANYTHING TO END YOUR LIFE?: NO
1. IN THE PAST MONTH, HAVE YOU WISHED YOU WERE DEAD OR WISHED YOU COULD GO TO SLEEP AND NOT WAKE UP?: NO
2. HAVE YOU ACTUALLY HAD ANY THOUGHTS OF KILLING YOURSELF?: NO

## 2025-03-06 ASSESSMENT — PAIN SCALES - GENERAL: PAINLEVEL_OUTOF10: 0-NO PAIN

## 2025-03-06 NOTE — PROGRESS NOTES
Department of Medicine  Division of Pulmonary, Critical Care, and Sleep Medicine  Consultation  [facility] - [address], [address]       Physician HPI (3/6/2025):   70 yo man referred by Dr. Santos for lung nodule.   Patient does not have any respiratory complaints, had cardiac CT which showed 15 mm right lower lobe nodule with calcification.     Never smoked but had secondhand smoke exposure in the past.  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    Immunization History   Administered Date(s) Administered    COVID-19, mRNA, LNP-S, PF, 30 mcg/0.3 mL dose 09/25/2021    Flu vaccine, quadrivalent, high-dose, preservative free, age 65y+ (FLUZONE) 10/03/2021    Flu vaccine, quadrivalent, no egg protein, age 6 month or greater (FLUCELVAX) 09/29/2022    Flu vaccine, trivalent, preservative free, HIGH-DOSE, age 65y+ (Fluzone) 10/06/2024    Influenza, Seasonal, Quadrivalent, Adjuvanted 10/12/2023    Influenza, Unspecified 10/15/2006    Influenza, seasonal, injectable 10/08/2015, 09/20/2016, 10/24/2017    Influenza, trivalent, adjuvanted 10/02/2019    Pfizer COVID-19 vaccine, 12 years and older, (30mcg/0.3mL) (Comirnaty) 09/18/2024    Pfizer COVID-19 vaccine, bivalent, age 12 years and older (30 mcg/0.3 mL) 09/14/2022    Pfizer Purple Cap SARS-CoV-2 02/08/2021, 03/01/2021, 03/31/2022    Pneumococcal conjugate vaccine, 13-valent (PREVNAR 13) 10/08/2015    Pneumococcal conjugate vaccine, 20-valent (PREVNAR 20) 11/16/2022    Pneumococcal polysaccharide vaccine, 23-valent, age 2 years and older (PNEUMOVAX 23) 10/17/2016, 11/16/2021    RSV, 60 Years And Older (AREXVY) 12/29/2023    SARS-CoV-2, Unspecified 03/31/2022    Td vaccine, age 7 years and older (TDVAX) 11/11/2005    Tdap vaccine, age 7 year and older (BOOSTRIX, ADACEL) 09/20/2016    Zoster, Unspecified 10/01/2019, 12/03/2019       Past Medical History:   Diagnosis Date    Acute laryngitis 05/01/2023     Allergic 1978    Dorsalgia, unspecified     Back pain, acute    Heart disease 2011    Hypertension 1998    Insomnia, unspecified     Insomnia, controlled    Laceration of finger 05/03/2024    Otitis externa; acute 05/01/2023    Parasomnia, unspecified 11/02/2017    Parasomnia, unspecified    Personal history of other diseases of the circulatory system 06/23/2016    History of hypertension    Personal history of other diseases of the nervous system and sense organs     History of sleep apnea    Pure hypercholesterolemia, unspecified 06/22/2016    High cholesterol    Pure hyperglyceridemia     High triglycerides       Past Surgical History:   Procedure Laterality Date    BREAST BIOPSY  2014    INCISIONAL BREAST BIOPSY  12/02/2015    Incisional Breast Biopsy    OTHER SURGICAL HISTORY  07/06/2017    Intramural Ablation Of Turbinate Soft Tissue    OTHER SURGICAL HISTORY  06/19/2020    Nasal septoplasty    OTHER SURGICAL HISTORY  04/05/2016    Surgery       Family History   Problem Relation Name Age of Onset    Thyroid cancer Sister      Heart disease Other Family history     Cancer Other Family history     Alzheimer's disease Other Family history     Other (pancreatic disorders) Other Family history     Arthritis Mother Allison Amin     Stroke Mother Allison Amin     Heart disease Father Kevin Amin     Heart attack Father Kevin Eloisa     Heart disease Maternal Grandmother Rosalinda Leung     Stroke Maternal Grandmother Rosalinda Leung     Cancer Paternal Grandfather Pino Amin     Arthritis Sister Rukhsana Olexa     Cancer Sister Rukhsana Olexa     Cancer Mother's Sister La Kosko     Cancer Father's Sister Sandor Ramires     Colon cancer Father's Sister Sandor Ramires     Cancer Father's Brother Saeid Amin     Colon cancer Father's Brother Saeid Eloisa     Heart disease Sister Sridevi Donspike     Heart attack Sister Sridevi Donspike     Heart disease Brother Kevin Amin JR     Heart attack Brother Kevin Amin JR        Social History      Tobacco Use    Smoking status: Never    Smokeless tobacco: Never   Vaping Use    Vaping status: Never Used   Substance Use Topics    Alcohol use: Yes     Alcohol/week: 6.0 standard drinks of alcohol     Types: 6 Glasses of wine per week    Drug use: Never       Occupational & Environmental History:        Current Medications:  Current Outpatient Medications   Medication Instructions    alclometasone (Aclovate) 0.05 % cream 2 times daily    amLODIPine (NORVASC) 10 mg, oral, Daily    aspirin 81 mg EC tablet Take by mouth.    calcium-D3-zinc-copper-arnol (Citracal-D3 Maximum Plus) 325 mg-12.5 mcg -2.75 mg tablet     cholecalciferol-soy isoflavone 2,000-64 unit-mg tablet     ezetimibe (Zetia) 10 mg tablet TAKE ONE TABLET BY MOUTH DAILY    fluticasone (Flonase) 50 mcg/actuation nasal spray 1 spray, Each Nostril, Daily    Lactobacillus rhamnosus GG (Culturelle) 15 billion cell capsule, sprinkle capsule 1 capsule, 2 times daily    montelukast (SINGULAIR) 10 mg, oral, Daily    multivitamin with minerals iron-free (Centrum Silver) 1 tablet, Daily    ramipril (ALTACE) 10 mg, oral, 2 times daily    Repatha SureClick 140 mg/mL injection INJECT 140 MG (1 PEN) UNDER THE SKIN ONCE EVERY 2 WEEKS AS DIRECTED    rosuvastatin (Crestor) 40 mg tablet TAKE ONE TABLET BY MOUTH ONCE DAILY    tamsulosin (FLOMAX) 0.4 mg, oral, Daily    zolpidem (Ambien) 5 mg tablet TAKE ONE-HALF TABLET BY MOUTH once daily AT BEDTIME        Drug Allergies/Intolerances:  Allergies   Allergen Reactions    Bee Pollen Unknown    House Dust Unknown    Ragweed Unknown    Weed Pollen-Short Ragweed Unknown        Visit Vitals  BP (!) 157/93   Pulse 96   Resp 16   Wt 88.3 kg (194 lb 9.6 oz)   SpO2 97%   BMI 33.40 kg/m²   OB Status Postmenopausal   Smoking Status Never   BSA 2 m²        Physical exam  Constitutional: Normal appearance.  HEENT: Normocephalic and atraumatic.  Cardiovascular: Normal rate and regular rhythm.  Pulmonary: Normal respiratory effort,  "bilateral clear breath sounds, no wheezing or rhonchi.  Musculoskeletal: No edema, no cyanosis.  Neurological: Awake, alert and oriented x3.  Psychiatric: Normal behavior, mood and affect.    Pulmonary Function Test Results     Pulmonary Functions Testing Results:    No results found for: \"FEV1\", \"FVC\", \"JMU0ONC\", \"TLC\", \"DLCO\"     Chest Radiograph     No results found for this or any previous visit from the past 2000 days.      Echocardiogram     No results found for this or any previous visit from the past 365 days.       Chest CT Scan     No results found for this or any previous visit from the past 365 days.       Laboratory Studies     Lab Results   Component Value Date    WBC 5.3 11/15/2024    HGB 15.0 11/15/2024    HCT 47.6 (H) 11/15/2024    MCV 84 11/15/2024     11/15/2024      Lab Results   Component Value Date    GLUCOSE 102 (H) 11/15/2024    CALCIUM 9.8 11/15/2024     11/15/2024    K 4.7 11/15/2024    CO2 31 11/15/2024    CL 99 11/15/2024    BUN 15 11/15/2024    CREATININE 0.76 11/15/2024      Lab Results   Component Value Date    ALT 23 11/15/2024    AST 18 11/15/2024    ALKPHOS 75 11/15/2024    BILITOT 0.6 11/15/2024      Protime   Date/Time Value Ref Range Status   11/21/2019 11:35 PM 10.9 9.7 - 12.7 sec Final     INR   Date/Time Value Ref Range Status   11/21/2019 11:35 PM 1.0 0.9 - 1.1 Final     No results found for: \"ICIGE\", \"IGE\", \"ICA04\", \"ASPFU\", \"IGG\", \"IGA\", \"IGM\"    Sputum Culture     No results found for: \"AFBCX\"   No results found for: \"RESPCULTCYFI\"  No results found for the last 90 days.          Assessment and Plan / Recommendations   70 yo man referred by Dr. Santos for lung nodule.   Patient does not have any respiratory complaints, had cardiac CT which showed 15 mm right lower lobe nodule with calcification.   Will obtain PET scan to further evaluate the nodule and decide if needs biopsy or not.  If has low uptake will obtain CT in 3 months and follow-up after.    This " dictation was created using voice recognition software. Phonetic and/or minor grammatical errors may exist.    Aryan Dumont MD  03/06/2025

## 2025-03-11 ENCOUNTER — TELEPHONE (OUTPATIENT)
Dept: SCHEDULING | Age: 70
End: 2025-03-11
Payer: MEDICARE

## 2025-03-12 ENCOUNTER — HOSPITAL ENCOUNTER (OUTPATIENT)
Dept: RADIOLOGY | Facility: CLINIC | Age: 70
Discharge: HOME | End: 2025-03-12
Payer: MEDICARE

## 2025-03-12 DIAGNOSIS — R91.1 LUNG NODULE: ICD-10-CM

## 2025-03-12 DIAGNOSIS — D38.1 NEOPLASM OF UNCERTAIN BEHAVIOR OF LUNG: ICD-10-CM

## 2025-03-12 DIAGNOSIS — R91.1 LUNG NODULE SEEN ON IMAGING STUDY: ICD-10-CM

## 2025-03-12 PROCEDURE — 78815 PET IMAGE W/CT SKULL-THIGH: CPT | Mod: PI

## 2025-03-12 PROCEDURE — A9552 F18 FDG: HCPCS | Performed by: INTERNAL MEDICINE

## 2025-03-12 PROCEDURE — 3430000001 HC RX 343 DIAGNOSTIC RADIOPHARMACEUTICALS: Performed by: INTERNAL MEDICINE

## 2025-03-12 PROCEDURE — 78815 PET IMAGE W/CT SKULL-THIGH: CPT | Mod: PET TUMOR INIT TX STRAT | Performed by: STUDENT IN AN ORGANIZED HEALTH CARE EDUCATION/TRAINING PROGRAM

## 2025-03-12 RX ORDER — FLUDEOXYGLUCOSE F 18 200 MCI/ML
12.93 INJECTION, SOLUTION INTRAVENOUS
Status: COMPLETED | OUTPATIENT
Start: 2025-03-12 | End: 2025-03-12

## 2025-03-12 RX ADMIN — FLUDEOXYGLUCOSE F 18 12.93 MILLICURIE: 200 INJECTION, SOLUTION INTRAVENOUS at 07:50

## 2025-03-13 ENCOUNTER — APPOINTMENT (OUTPATIENT)
Dept: PULMONOLOGY | Facility: CLINIC | Age: 70
End: 2025-03-13
Payer: MEDICARE

## 2025-04-18 ENCOUNTER — APPOINTMENT (OUTPATIENT)
Dept: SLEEP MEDICINE | Facility: CLINIC | Age: 70
End: 2025-04-18
Payer: MEDICARE

## 2025-04-25 ENCOUNTER — APPOINTMENT (OUTPATIENT)
Dept: SLEEP MEDICINE | Facility: CLINIC | Age: 70
End: 2025-04-25
Payer: MEDICARE

## 2025-04-25 VITALS
TEMPERATURE: 98.3 F | WEIGHT: 195.6 LBS | SYSTOLIC BLOOD PRESSURE: 138 MMHG | DIASTOLIC BLOOD PRESSURE: 80 MMHG | BODY MASS INDEX: 33.57 KG/M2 | HEART RATE: 79 BPM

## 2025-04-25 DIAGNOSIS — G47.33 OBSTRUCTIVE SLEEP APNEA: ICD-10-CM

## 2025-04-25 DIAGNOSIS — I10 BENIGN ESSENTIAL HYPERTENSION: Primary | ICD-10-CM

## 2025-04-25 PROCEDURE — 1157F ADVNC CARE PLAN IN RCRD: CPT | Performed by: STUDENT IN AN ORGANIZED HEALTH CARE EDUCATION/TRAINING PROGRAM

## 2025-04-25 PROCEDURE — 1036F TOBACCO NON-USER: CPT | Performed by: STUDENT IN AN ORGANIZED HEALTH CARE EDUCATION/TRAINING PROGRAM

## 2025-04-25 PROCEDURE — 1159F MED LIST DOCD IN RCRD: CPT | Performed by: STUDENT IN AN ORGANIZED HEALTH CARE EDUCATION/TRAINING PROGRAM

## 2025-04-25 PROCEDURE — 99214 OFFICE O/P EST MOD 30 MIN: CPT | Performed by: STUDENT IN AN ORGANIZED HEALTH CARE EDUCATION/TRAINING PROGRAM

## 2025-04-25 PROCEDURE — 95977 ALYS CPLX CN NPGT PRGRMG: CPT | Performed by: STUDENT IN AN ORGANIZED HEALTH CARE EDUCATION/TRAINING PROGRAM

## 2025-04-25 PROCEDURE — 1160F RVW MEDS BY RX/DR IN RCRD: CPT | Performed by: STUDENT IN AN ORGANIZED HEALTH CARE EDUCATION/TRAINING PROGRAM

## 2025-04-25 PROCEDURE — 3075F SYST BP GE 130 - 139MM HG: CPT | Performed by: STUDENT IN AN ORGANIZED HEALTH CARE EDUCATION/TRAINING PROGRAM

## 2025-04-25 PROCEDURE — 3079F DIAST BP 80-89 MM HG: CPT | Performed by: STUDENT IN AN ORGANIZED HEALTH CARE EDUCATION/TRAINING PROGRAM

## 2025-04-25 ASSESSMENT — SLEEP AND FATIGUE QUESTIONNAIRES
HOW LIKELY ARE YOU TO NOD OFF OR FALL ASLEEP WHILE SITTING AND TALKING TO SOMEONE: WOULD NEVER DOZE
SLEEP_PROBLEM_INTERFERES_DAILY_ACTIVITIES: NOT AT ALL NOTICEABLE
SITING INACTIVE IN A PUBLIC PLACE LIKE A CLASS ROOM OR A MOVIE THEATER: WOULD NEVER DOZE
WAKING_TOO_EARLY: MILD
SLEEP_PROBLEM_NOTICEABLE_TO_OTHERS: NOT AT ALL NOTICEABLE
HOW LIKELY ARE YOU TO NOD OFF OR FALL ASLEEP WHILE SITTING AND READING: SLIGHT CHANCE OF DOZING
DIFFICULTY_FALLING_ASLEEP: MILD
SATISFACTION_WITH_CURRENT_SLEEP_PATTERN: VERY SATISFIED
WORRIED_DISTRESSED_DUE_TO_SLEEP: NOT AT ALL NOTICEABLE
HOW LIKELY ARE YOU TO NOD OFF OR FALL ASLEEP WHILE WATCHING TV: SLIGHT CHANCE OF DOZING
HOW LIKELY ARE YOU TO NOD OFF OR FALL ASLEEP WHEN YOU ARE A PASSENGER IN A CAR FOR AN HOUR WITHOUT A BREAK: WOULD NEVER DOZE
HOW LIKELY ARE YOU TO NOD OFF OR FALL ASLEEP WHILE LYING DOWN TO REST IN THE AFTERNOON WHEN CIRCUMSTANCES PERMIT: SLIGHT CHANCE OF DOZING
HOW LIKELY ARE YOU TO NOD OFF OR FALL ASLEEP WHILE SITTING QUIETLY AFTER LUNCH WITHOUT ALCOHOL: WOULD NEVER DOZE
ESS-CHAD TOTAL SCORE: 3
HOW LIKELY ARE YOU TO NOD OFF OR FALL ASLEEP IN A CAR, WHILE STOPPED FOR A FEW MINUTES IN TRAFFIC: WOULD NEVER DOZE
DIFFICULTY_STAYING_ASLEEP: MODERATE

## 2025-04-25 NOTE — PROGRESS NOTES
" Patient: Nallely Neumann    [id number]  : 1955 -- AGE 69 y.o.    Provider: Ander Gonzalez MD     Location [facility]   Service Date: 2025              [facility]  Followup Visit Note     ASSESSMENT AND PLAN   Ms. Neumann is a 69 y.o. female and she returns in followup to the [facility] for the problems listed below on 25     Problem List, Orders, Assessment, Recommendations:  Problem List Items Addressed This Visit           ICD-10-CM    Benign essential hypertension - Primary I10    BP Readings from Last 1 Encounters:   25 138/80     - doing well, asymptomatic  - discussed at length the impact of untreated HUMAIRA and BP control  - continue current management and follow-up with PCP           Obstructive sleep apnea G47.33    - History of sleep apnea, moderate-severe. She is s/p Inspire implantation in 2016 with Dr. Baez. She was initially followed by Dr. Avila and then by Dr. Gusman. She continues to follow with Dr. Gusman and Dr. Faustin for her insomnia and with me for her HUMAIRA therapy.  - She had two pre op sleep tests- HST in  showing AHI 45 and PSG in 2016 (pt notes \"Study was a nightmare\") that showed AHI ~20.   - When she was activated in 2016, FT was 2.2 at default settings, She eventually stepped up to 3.1 at which she had HST, AHI was 10 on that study  - in  she continued to step up to as high as 3.8V thereafter  - we turned it down to 1.8 - 2.7 (@ 2.1 v: LV 4) at last visit on 2022  - she is now using it about 4.5 hrs a night, with multiple pauses    Analysis on 2024  - outgoing setting at 2.0-2.4 (@ 2.3v)  -  pause time changed to 10 min, start delay changed to 35 min  - encourage to use it more    Analysis on 10/24/2024  ST: 1.8  FT: 2.3  Outgoing settin.2-2.6 (@2.4v)  Pause time remains at 10 min, start delay remains at 35 min  Encourage usage extension    Analysis " "2024  - not the greatest tongue motion, (more like straight out and curl downward)    - however, with  - off -, @ 1.2v, the tongue motion was pretty good.    Incoming setting 2.2-2.6 (@2.4v)  Outgoing settin.2-2.8 (@2.6v)  Start Delay: 35 min, pause time: 10 min,   Encourage usage extension  Call in 2 months, if all goes well, we will get an HSAT         BMI 33.0-33.9,adult Z68.33     Disposition  Will call with sleep study results and determine F/U plan        HISTORY OF PRESENT ILLNESS     HISTORY OF PRESENT ILLNESS   Nallely Neumann is a 69 y.o. female with history of Obesity and HUMAIRA presents to [facility] for followup.     Assessment and plan from last visit:   Ms. Neumann is a 69 y.o. female and she returns in followup to the [facility] for the problems listed below on 10/25/24      Problem List, Orders, Assessment, Recommendations:  Problem List Items Addressed This Visit               ICD-10-CM     Benign essential hypertension I10           BP Readings from Last 1 Encounters:   10/25/24 142/83      - doing well, asymptomatic  - discussed at length the impact of untreated HUMAIRA and BP control  - continue current management and follow-up with PCP              Obstructive sleep apnea - Primary G47.33       - History of sleep apnea, moderate-severe. She is s/p Inspire implantation in 2016 with Dr. Baez. She was initially followed by Dr. Avila and then by Dr. Gusman. She continues to follow with Dr. Gusman and Dr. Faustin for her insomnia and with me for her HUMAIRA therapy.  - She had two pre op sleep tests- HST in  showing AHI 45 and PSG in 2016 (pt notes \"Study was a nightmare\") that showed AHI ~20.   - When she was activated in 2016, FT was 2.2 at default settings, She eventually stepped up to 3.1 at which she had HST, AHI was 10 on that study  - in  she continued to step up to as high as 3.8V thereafter  - we " turned it down to 1.8 - 2.7 (@ 2.1 v: LV 4) at last visit on 2022  - she is now using it about 4.5 hrs a night, with multiple pauses     Analysis on 2024  - outgoing setting at 2.0-2.4 (@ 2.3v)  -  pause time changed to 10 min, start delay changed to 35 min  - encourage to use it more     Analysis on 10/24/2024  ST: 1.8  FT: 2.3  Outgoing settin.2-2.6 (@2.4v)  Pause time remains at 10 min, start delay remains at 35 min  Encourage usage extension              Relevant Orders     Follow Up In Adult Sleep Medicine     BMI 32.0-32.9,adult Z68.32           BMI Readings from Last 1 Encounters:   10/25/24 32.45 kg/m²      - encouraged healthy weight loss via diet and exercise                  Disposition     Return to clinic in 6 months    Current History    On today's visit, the patient reports that she is doing well.  Usage better than before.  She feels very comfortable with the therapy.  INSPIRE therapy made such an improvement to her sleep ever since it was implanted!    INSPIRE Adherence  INSPIRE Adherence : INSPIRE adherence was obtained and data was reviewed personally today in clinic.    Daytime Symptoms  Patient reports: no daytime symptoms  Patient denies: excessive daytime sleepiness  Napping habit: Patient denies taking naps.  Fatigue concerns: Patient denies feeling fatigue    ESS: 3  NOE: 4  FOSQ: 40    PHYSICAL EXAM     VITAL SIGNS: /80 (BP Location: Right arm, Patient Position: Sitting, BP Cuff Size: Adult)   Pulse 79   Temp 36.8 °C (98.3 °F) (Oral)   Wt 88.7 kg (195 lb 9.6 oz)   BMI 33.57 kg/m²      PREVIOUS WEIGHTS:  Wt Readings from Last 3 Encounters:   25 88.7 kg (195 lb 9.6 oz)   25 88.3 kg (194 lb 9.6 oz)   24 86.6 kg (191 lb)

## 2025-04-25 NOTE — ASSESSMENT & PLAN NOTE
"- History of sleep apnea, moderate-severe. She is s/p Inspire implantation in 2016 with Dr. Baez. She was initially followed by Dr. Avila and then by Dr. Gusman. She continues to follow with Dr. Gusman and Dr. Faustin for her insomnia and with me for her HUMAIRA therapy.  - She had two pre op sleep tests- HST in  showing AHI 45 and PSG in 2016 (pt notes \"Study was a nightmare\") that showed AHI ~20.   - When she was activated in 2016, FT was 2.2 at default settings, She eventually stepped up to 3.1 at which she had HST, AHI was 10 on that study  - in  she continued to step up to as high as 3.8V thereafter  - we turned it down to 1.8 - 2.7 (@ 2.1 v: LV 4) at last visit on 2022  - she is now using it about 4.5 hrs a night, with multiple pauses    Analysis on 2024  - outgoing setting at 2.0-2.4 (@ 2.3v)  -  pause time changed to 10 min, start delay changed to 35 min  - encourage to use it more    Analysis on 10/24/2024  ST: 1.8  FT: 2.3  Outgoing settin.2-2.6 (@2.4v)  Pause time remains at 10 min, start delay remains at 35 min  Encourage usage extension    Analysis 2024  - not the greatest tongue motion, (more like straight out and curl downward)    - however, with  - off -, @ 1.2v, the tongue motion was pretty good.    Incoming setting 2.2-2.6 (@2.4v)  Outgoing settin.2-2.8 (@2.6v)  Start Delay: 35 min, pause time: 10 min,   Encourage usage extension  Call in 2 months, if all goes well, we will get an HSAT  "

## 2025-04-25 NOTE — ASSESSMENT & PLAN NOTE
BP Readings from Last 1 Encounters:   04/25/25 138/80     - doing well, asymptomatic  - discussed at length the impact of untreated HUMAIRA and BP control  - continue current management and follow-up with PCP

## 2025-05-13 ENCOUNTER — APPOINTMENT (OUTPATIENT)
Dept: PRIMARY CARE | Facility: CLINIC | Age: 70
End: 2025-05-13
Payer: MEDICARE

## 2025-05-13 VITALS
WEIGHT: 190.8 LBS | SYSTOLIC BLOOD PRESSURE: 126 MMHG | HEART RATE: 74 BPM | OXYGEN SATURATION: 97 % | DIASTOLIC BLOOD PRESSURE: 78 MMHG | BODY MASS INDEX: 32.75 KG/M2

## 2025-05-13 DIAGNOSIS — E78.01 FAMILIAL HYPERCHOLESTEROLEMIA: ICD-10-CM

## 2025-05-13 DIAGNOSIS — E78.41 ELEVATED LIPOPROTEIN(A): ICD-10-CM

## 2025-05-13 DIAGNOSIS — Z79.899 MEDICATION MANAGEMENT: ICD-10-CM

## 2025-05-13 DIAGNOSIS — I10 BENIGN ESSENTIAL HYPERTENSION: Primary | ICD-10-CM

## 2025-05-13 DIAGNOSIS — L40.8 OTHER PSORIASIS: ICD-10-CM

## 2025-05-13 PROCEDURE — 1159F MED LIST DOCD IN RCRD: CPT | Performed by: FAMILY MEDICINE

## 2025-05-13 PROCEDURE — 3074F SYST BP LT 130 MM HG: CPT | Performed by: FAMILY MEDICINE

## 2025-05-13 PROCEDURE — 1036F TOBACCO NON-USER: CPT | Performed by: FAMILY MEDICINE

## 2025-05-13 PROCEDURE — 1160F RVW MEDS BY RX/DR IN RCRD: CPT | Performed by: FAMILY MEDICINE

## 2025-05-13 PROCEDURE — 99214 OFFICE O/P EST MOD 30 MIN: CPT | Performed by: FAMILY MEDICINE

## 2025-05-13 PROCEDURE — 3078F DIAST BP <80 MM HG: CPT | Performed by: FAMILY MEDICINE

## 2025-05-13 PROCEDURE — G2211 COMPLEX E/M VISIT ADD ON: HCPCS | Performed by: FAMILY MEDICINE

## 2025-05-13 ASSESSMENT — ENCOUNTER SYMPTOMS
APPETITE CHANGE: 0
NAUSEA: 0
UNEXPECTED WEIGHT CHANGE: 0

## 2025-05-13 NOTE — PROGRESS NOTES
Subjective   Patient ID: Nallely Neumann is a 69 y.o. female who presents for Follow-up (6 months, would like to discuss dermatologist has recommended some new medications for psoriasis.  Pt would like to make sure she is vaccinated against shingles, had on  (SHINGRIX))12/3/2019, 10/1/2019.).    HPI   Patient has hx of stable hypertension, hyperlipidemia.  Pt denies chest pain, shortness of breath and edema.  Patient's current treatment as listed in Rx.  Patient is compliant with treatment and complains of no side effects associated treatment.      Review of Systems   Constitutional:  Negative for appetite change and unexpected weight change.   Eyes:  Negative for visual disturbance.   Gastrointestinal:  Negative for nausea.       Objective   /78   Pulse 74   Wt 86.5 kg (190 lb 12.8 oz)   SpO2 97%   BMI 32.75 kg/m²     Physical Exam  HENT:      Head: Normocephalic and atraumatic.      Nose: Nose normal.      Mouth/Throat:      Mouth: Mucous membranes are moist.      Pharynx: No oropharyngeal exudate.   Eyes:      Extraocular Movements: Extraocular movements intact.      Conjunctiva/sclera: Conjunctivae normal.      Pupils: Pupils are equal, round, and reactive to light.   Cardiovascular:      Rate and Rhythm: Normal rate and regular rhythm.   Pulmonary:      Effort: Pulmonary effort is normal.      Breath sounds: Normal breath sounds.   Abdominal:      General: There is no distension.      Palpations: Abdomen is soft.   Musculoskeletal:      Cervical back: Normal range of motion and neck supple.   Lymphadenopathy:      Cervical: No cervical adenopathy.   Neurological:      General: No focal deficit present.      Mental Status: She is alert.   Psychiatric:         Attention and Perception: Attention normal.         Speech: Speech normal.         Behavior: Behavior is cooperative.         Assessment/Plan   Problem List Items Addressed This Visit           ICD-10-CM    Benign essential hypertension - Primary  I10    Treated and controlled         Relevant Orders    Lipid Panel    CBC and Auto Differential    Comprehensive Metabolic Panel    Lipoprotein A (LPA)    Elevated lipoprotein(a) E78.41    Relevant Orders    Lipid Panel    CBC and Auto Differential    Comprehensive Metabolic Panel    Lipoprotein A (LPA)    Familial hypercholesterolemia E78.01    Treated and followed by cardiology         Relevant Orders    Lipid Panel    CBC and Auto Differential    Comprehensive Metabolic Panel    Lipoprotein A (LPA)    Other psoriasis L40.8    Followed by dermatology          Other Visit Diagnoses         Codes      Medication management     Z79.899    Relevant Orders    Zolpidem Confirmation, Urine    Drug Screen, Urine With Reflex to Confirmation        HM LM discussed  Reviewed labs and check labs   I have personally reviewed the [facility] report for this patient. I have considered the risks of abuse, dependence, addiction and diversion.  Recheck 6 months sooner if any issues arise

## 2025-05-15 LAB
AMPHET UR-MCNC: NEGATIVE NG/ML
AMPHETAMINES UR QL: NORMAL NG/ML
BARBITURATES UR QL: NEGATIVE NG/ML
BENZODIAZ UR QL: NEGATIVE NG/ML
BZE UR QL: NEGATIVE NG/ML
CREAT UR-MCNC: 129.6 MG/DL
DRUG SCREEN COMMENT UR-IMP: ABNORMAL
DRUG SCREEN COMMENT UR-IMP: NORMAL
FENTANYL UR QL SCN: NEGATIVE NG/ML
METHADONE UR QL: NEGATIVE NG/ML
METHAMPHET UR-MCNC: NEGATIVE NG/ML
OPIATES UR QL: NEGATIVE NG/ML
OXIDANTS UR QL: NEGATIVE MCG/ML
OXYCODONE UR QL: NEGATIVE NG/ML
PCP UR QL: NEGATIVE NG/ML
PH UR: 7.1 [PH] (ref 4.5–9)
QUEST NOTES AND COMMENTS: ABNORMAL
QUEST ZOLPIDEM: NEGATIVE NG/ML
THC UR QL: NEGATIVE NG/ML
ZOLPIDEM PHENYL-4-CARB UR CFM-MCNC: 1990 NG/ML

## 2025-05-17 DIAGNOSIS — F51.04 CHRONIC INSOMNIA: ICD-10-CM

## 2025-05-17 DIAGNOSIS — Z79.899 MEDICATION MANAGEMENT: ICD-10-CM

## 2025-05-19 RX ORDER — ZOLPIDEM TARTRATE 5 MG/1
TABLET ORAL
Qty: 45 TABLET | Refills: 0 | Status: SHIPPED | OUTPATIENT
Start: 2025-05-19

## 2025-05-22 ENCOUNTER — APPOINTMENT (OUTPATIENT)
Dept: OPHTHALMOLOGY | Facility: CLINIC | Age: 70
End: 2025-05-22
Payer: MEDICARE

## 2025-05-22 DIAGNOSIS — H25.013 CORTICAL AGE-RELATED CATARACT, BILATERAL: ICD-10-CM

## 2025-05-22 DIAGNOSIS — H40.003 GLAUCOMA SUSPECT OF BOTH EYES: Primary | ICD-10-CM

## 2025-05-22 PROCEDURE — 92133 CPTRZD OPH DX IMG PST SGM ON: CPT | Performed by: OPHTHALMOLOGY

## 2025-05-22 PROCEDURE — 99214 OFFICE O/P EST MOD 30 MIN: CPT | Performed by: OPHTHALMOLOGY

## 2025-05-22 ASSESSMENT — REFRACTION_MANIFEST
OS_SPHERE: +0.50
OD_ADD: +2.50
OS_CYLINDER: SPHERE
OD_CYLINDER: SPHERE
OD_SPHERE: +0.25
OS_ADD: +2.50

## 2025-05-22 ASSESSMENT — ENCOUNTER SYMPTOMS
GASTROINTESTINAL NEGATIVE: 0
ENDOCRINE NEGATIVE: 0
HEMATOLOGIC/LYMPHATIC NEGATIVE: 0
CARDIOVASCULAR NEGATIVE: 0
EYES NEGATIVE: 1
MUSCULOSKELETAL NEGATIVE: 0
RESPIRATORY NEGATIVE: 0
CONSTITUTIONAL NEGATIVE: 0
ALLERGIC/IMMUNOLOGIC NEGATIVE: 0
NEUROLOGICAL NEGATIVE: 0
PSYCHIATRIC NEGATIVE: 0

## 2025-05-22 ASSESSMENT — VISUAL ACUITY
METHOD: SNELLEN - LINEAR
OD_SC: 20/20
OS_SC: 20/25+2

## 2025-05-22 ASSESSMENT — TONOMETRY
OS_IOP_MMHG: 14
OD_IOP_MMHG: 14
IOP_METHOD: GOLDMANN APPLANATION

## 2025-05-22 ASSESSMENT — SLIT LAMP EXAM - LIDS
COMMENTS: GOOD POSITION
COMMENTS: GOOD POSITION

## 2025-05-22 ASSESSMENT — CUP TO DISC RATIO
OS_RATIO: 0.1
OD_RATIO: 0.2

## 2025-05-22 ASSESSMENT — EXTERNAL EXAM - LEFT EYE: OS_EXAM: NORMAL

## 2025-05-22 ASSESSMENT — EXTERNAL EXAM - RIGHT EYE: OD_EXAM: NORMAL

## 2025-05-24 LAB
ALBUMIN SERPL-MCNC: 4.6 G/DL (ref 3.6–5.1)
ALP SERPL-CCNC: 72 U/L (ref 37–153)
ALT SERPL-CCNC: 21 U/L (ref 6–29)
ANION GAP SERPL CALCULATED.4IONS-SCNC: 10 MMOL/L (CALC) (ref 7–17)
AST SERPL-CCNC: 18 U/L (ref 10–35)
BASOPHILS # BLD AUTO: 49 CELLS/UL (ref 0–200)
BASOPHILS NFR BLD AUTO: 1 %
BILIRUB SERPL-MCNC: 0.5 MG/DL (ref 0.2–1.2)
BUN SERPL-MCNC: 16 MG/DL (ref 7–25)
CALCIUM SERPL-MCNC: 9.8 MG/DL (ref 8.6–10.4)
CHLORIDE SERPL-SCNC: 101 MMOL/L (ref 98–110)
CHOLEST SERPL-MCNC: 141 MG/DL
CHOLEST/HDLC SERPL: 1.5 (CALC)
CO2 SERPL-SCNC: 28 MMOL/L (ref 20–32)
CREAT SERPL-MCNC: 0.7 MG/DL (ref 0.6–1)
EGFRCR SERPLBLD CKD-EPI 2021: 92 ML/MIN/1.73M2
EOSINOPHIL # BLD AUTO: 172 CELLS/UL (ref 15–500)
EOSINOPHIL NFR BLD AUTO: 3.5 %
ERYTHROCYTE [DISTWIDTH] IN BLOOD BY AUTOMATED COUNT: 12.4 % (ref 11–15)
GLUCOSE SERPL-MCNC: 102 MG/DL (ref 65–99)
HCT VFR BLD AUTO: 45 % (ref 35–45)
HDLC SERPL-MCNC: 92 MG/DL
HGB BLD-MCNC: 14.7 G/DL (ref 11.7–15.5)
LDLC SERPL CALC-MCNC: 37 MG/DL (CALC)
LPA SERPL-SCNC: 522 NMOL/L
LYMPHOCYTES # BLD AUTO: 1049 CELLS/UL (ref 850–3900)
LYMPHOCYTES NFR BLD AUTO: 21.4 %
MCH RBC QN AUTO: 27.6 PG (ref 27–33)
MCHC RBC AUTO-ENTMCNC: 32.7 G/DL (ref 32–36)
MCV RBC AUTO: 84.4 FL (ref 80–100)
MONOCYTES # BLD AUTO: 495 CELLS/UL (ref 200–950)
MONOCYTES NFR BLD AUTO: 10.1 %
NEUTROPHILS # BLD AUTO: 3136 CELLS/UL (ref 1500–7800)
NEUTROPHILS NFR BLD AUTO: 64 %
NONHDLC SERPL-MCNC: 49 MG/DL (CALC)
PLATELET # BLD AUTO: 331 THOUSAND/UL (ref 140–400)
PMV BLD REES-ECKER: 10.6 FL (ref 7.5–12.5)
POTASSIUM SERPL-SCNC: 4.6 MMOL/L (ref 3.5–5.3)
PROT SERPL-MCNC: 7.1 G/DL (ref 6.1–8.1)
RBC # BLD AUTO: 5.33 MILLION/UL (ref 3.8–5.1)
SODIUM SERPL-SCNC: 139 MMOL/L (ref 135–146)
TRIGL SERPL-MCNC: 42 MG/DL
WBC # BLD AUTO: 4.9 THOUSAND/UL (ref 3.8–10.8)

## 2025-06-04 ENCOUNTER — HOSPITAL ENCOUNTER (OUTPATIENT)
Dept: RADIOLOGY | Facility: HOSPITAL | Age: 70
Discharge: HOME | End: 2025-06-04
Payer: MEDICARE

## 2025-06-04 DIAGNOSIS — R91.1 LUNG NODULE: ICD-10-CM

## 2025-06-04 PROCEDURE — 71250 CT THORAX DX C-: CPT

## 2025-06-05 ENCOUNTER — TELEPHONE (OUTPATIENT)
Dept: PRIMARY CARE | Facility: CLINIC | Age: 70
End: 2025-06-05
Payer: MEDICARE

## 2025-06-05 DIAGNOSIS — R91.1 SOLITARY PULMONARY NODULE: ICD-10-CM

## 2025-06-05 NOTE — TELEPHONE ENCOUNTER
----- Message from Pino Santos sent at 6/5/2025 10:43 AM EDT -----  Good result  Lung nodules unchanged  The recommendation from radiology is to repeat the scan in 6 months  Nursing please schedule CT chest without IV contrast to be done in 6 months for diagnosis of solitary pulmonary nodule  ----- Message -----  From: Interface, Radiology Results In  Sent: 6/5/2025   9:12 AM EDT  To: Pino Santos MD

## 2025-06-23 ENCOUNTER — OFFICE VISIT (OUTPATIENT)
Dept: PULMONOLOGY | Facility: CLINIC | Age: 70
End: 2025-06-23
Payer: MEDICARE

## 2025-06-23 VITALS
OXYGEN SATURATION: 95 % | WEIGHT: 194 LBS | BODY MASS INDEX: 33.3 KG/M2 | HEART RATE: 81 BPM | DIASTOLIC BLOOD PRESSURE: 74 MMHG | SYSTOLIC BLOOD PRESSURE: 114 MMHG | RESPIRATION RATE: 16 BRPM

## 2025-06-23 DIAGNOSIS — R91.1 LUNG NODULE: Primary | ICD-10-CM

## 2025-06-23 PROCEDURE — 3074F SYST BP LT 130 MM HG: CPT | Performed by: INTERNAL MEDICINE

## 2025-06-23 PROCEDURE — 99212 OFFICE O/P EST SF 10 MIN: CPT

## 2025-06-23 PROCEDURE — 1126F AMNT PAIN NOTED NONE PRSNT: CPT | Performed by: INTERNAL MEDICINE

## 2025-06-23 PROCEDURE — 99213 OFFICE O/P EST LOW 20 MIN: CPT | Performed by: INTERNAL MEDICINE

## 2025-06-23 PROCEDURE — 3078F DIAST BP <80 MM HG: CPT | Performed by: INTERNAL MEDICINE

## 2025-06-23 PROCEDURE — 1036F TOBACCO NON-USER: CPT | Performed by: INTERNAL MEDICINE

## 2025-06-23 PROCEDURE — 1159F MED LIST DOCD IN RCRD: CPT | Performed by: INTERNAL MEDICINE

## 2025-06-23 ASSESSMENT — PATIENT HEALTH QUESTIONNAIRE - PHQ9
SUM OF ALL RESPONSES TO PHQ9 QUESTIONS 1 AND 2: 0
2. FEELING DOWN, DEPRESSED OR HOPELESS: NOT AT ALL
1. LITTLE INTEREST OR PLEASURE IN DOING THINGS: NOT AT ALL

## 2025-06-23 ASSESSMENT — ENCOUNTER SYMPTOMS: DEPRESSION: 0

## 2025-06-23 ASSESSMENT — PAIN SCALES - GENERAL: PAINLEVEL_OUTOF10: 0-NO PAIN

## 2025-06-23 NOTE — PROGRESS NOTES
Department of Medicine  Division of Pulmonary, Critical Care, and Sleep Medicine  Follow-Up Visit  [facility] - [address], [address]    Physician HPI (6/23/2025):  69-year-old woman presenting for follow-up on lung nodule.  No respiratory complaints, no changes in medical history since last visit.    Never smoked but had secondhand smoke exposure in the past. 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     Immunization History   Administered Date(s) Administered    COVID-19, mRNA, LNP-S, PF, 30 mcg/0.3 mL dose 09/25/2021    Flu vaccine, quadrivalent, high-dose, preservative free, age 65y+ (FLUZONE) 10/03/2021    Flu vaccine, quadrivalent, no egg protein, age 6 month or greater (FLUCELVAX) 09/29/2022    Flu vaccine, trivalent, preservative free, HIGH-DOSE, age 65y+ (Fluzone) 10/06/2024    Influenza, Seasonal, Quadrivalent, Adjuvanted 10/12/2023    Influenza, Unspecified 10/15/2006    Influenza, seasonal, injectable 10/08/2015, 09/20/2016, 10/24/2017    Influenza, trivalent, adjuvanted 10/02/2019    Pfizer COVID-19 vaccine, 12 years and older, (30mcg/0.3mL) (Comirnaty) 09/18/2024    Pfizer COVID-19 vaccine, bivalent, age 12 years and older (30 mcg/0.3 mL) 09/14/2022    Pfizer Purple Cap SARS-CoV-2 02/08/2021, 03/01/2021, 03/31/2022    Pneumococcal conjugate vaccine, 13-valent (PREVNAR 13) 10/08/2015    Pneumococcal conjugate vaccine, 20-valent (PREVNAR 20) 11/16/2022    Pneumococcal polysaccharide vaccine, 23-valent, age 2 years and older (PNEUMOVAX 23) 10/17/2016, 11/16/2021    RSV, 60 Years And Older (AREXVY) 12/29/2023    SARS-CoV-2, Unspecified 03/31/2022    Td vaccine, age 7 years and older (TDVAX) 11/11/2005    Tdap vaccine, age 7 year and older (BOOSTRIX, ADACEL) 09/20/2016    Zoster, Unspecified 10/01/2019, 12/03/2019       Current Medications:  Current Outpatient Medications   Medication Instructions    alclometasone (Aclovate) 0.05 % cream  "2 times daily    amLODIPine (NORVASC) 10 mg, oral, Daily    aspirin 81 mg EC tablet Take by mouth.    calcium-D3-zinc-copper-arnol (Citracal-D3 Maximum Plus) 325 mg-12.5 mcg -2.75 mg tablet     cholecalciferol-soy isoflavone 2,000-64 unit-mg tablet     ezetimibe (Zetia) 10 mg tablet TAKE ONE TABLET BY MOUTH DAILY    fluticasone (Flonase) 50 mcg/actuation nasal spray 1 spray, Each Nostril, Daily    Lactobacillus rhamnosus GG (Culturelle) 15 billion cell capsule, sprinkle capsule 1 capsule, 2 times daily    montelukast (SINGULAIR) 10 mg, oral, Daily    multivitamin with minerals iron-free (Centrum Silver) 1 tablet, Daily    ramipril (ALTACE) 10 mg, oral, 2 times daily    Repatha SureClick 140 mg/mL injection INJECT 140 MG (1 PEN) UNDER THE SKIN ONCE EVERY 2 WEEKS AS DIRECTED    rosuvastatin (Crestor) 40 mg tablet TAKE ONE TABLET BY MOUTH ONCE DAILY    zolpidem (Ambien) 5 mg tablet TAKE ONE-HALF TABLET BY MOUTH DAILY AT BEDTIME        Drug Allergies/Intolerances:  RX Allergies[1]       Visit Vitals  /74   Pulse 81   Resp 16   Wt 88 kg (194 lb)   SpO2 95%   BMI 33.30 kg/m²   OB Status Postmenopausal   Smoking Status Never   BSA 1.99 m²        Physical exam  Constitutional: Normal appearance.  HEENT: Normocephalic and atraumatic.  Cardiovascular: Normal rate and regular rhythm.  Pulmonary: Normal respiratory effort, bilateral clear breath sounds, no wheezing or rhonchi.  Musculoskeletal: No edema, no cyanosis.  Neurological: Awake, alert and oriented x3.  Psychiatric: Normal behavior, mood and affect.      Pulmonary Function Test Results     Pulmonary Functions Testing Results:    No results found for: \"FEV1\", \"FVC\", \"IOQ6PQJ\", \"TLC\", \"DLCO\"      Chest Radiograph     No results found for this or any previous visit from the past 2000 days.      Echocardiogram     No results found for this or any previous visit from the past 365 days.       Chest CT Scan     No results found for this or any previous visit from the " "past 365 days.       Laboratory Studies     Lab Results   Component Value Date    WBC 4.9 05/20/2025    HGB 14.7 05/20/2025    HCT 45.0 05/20/2025    MCV 84.4 05/20/2025     05/20/2025      Lab Results   Component Value Date    GLUCOSE 102 (H) 05/20/2025    CALCIUM 9.8 05/20/2025     05/20/2025    K 4.6 05/20/2025    CO2 28 05/20/2025     05/20/2025    BUN 16 05/20/2025    CREATININE 0.70 05/20/2025      Lab Results   Component Value Date    ALT 21 05/20/2025    AST 18 05/20/2025    ALKPHOS 72 05/20/2025    BILITOT 0.5 05/20/2025        Sputum Culture     No results found for: \"AFBCX\"   No results found for: \"RESPCULTCYFI\"  No results found for the last 90 days.          Assessment and Plan / Recommendations     70 yo woman presenting for follow-up on right lower lobe 16 mm partially calcified lung nodule on cardiac CT from March, PET scan with low uptake, follow-up CT in June showed.   Recommend follow-up with CT chest without contrast in 6 months, patient already scheduled for CT in December.  If stable then will need follow-up CT in 12 months.     This dictation was created using voice recognition software. Phonetic and/or minor grammatical errors may exist.        Aryan Dumont MD   06/23/2025         [1]   Allergies  Allergen Reactions    Bee Pollen Unknown    House Dust Unknown    Ragweed Unknown    Weed Pollen-Short Ragweed Unknown     "

## 2025-06-30 ENCOUNTER — TELEPHONE (OUTPATIENT)
Dept: SLEEP MEDICINE | Facility: HOSPITAL | Age: 70
End: 2025-06-30
Payer: MEDICARE

## 2025-06-30 NOTE — TELEPHONE ENCOUNTER
Spoke to pt, she will reach out when she is ready for an HSAT and I will connect with her in a few weeks to discuss how things are going.

## 2025-07-14 DIAGNOSIS — J30.9 ALLERGIC RHINITIS, UNSPECIFIED SEASONALITY, UNSPECIFIED TRIGGER: ICD-10-CM

## 2025-07-14 RX ORDER — FLUTICASONE PROPIONATE 50 MCG
1 SPRAY, SUSPENSION (ML) NASAL DAILY
Qty: 16 G | Refills: 0 | Status: SHIPPED | OUTPATIENT
Start: 2025-07-14

## 2025-08-13 DIAGNOSIS — Z79.899 MEDICATION MANAGEMENT: ICD-10-CM

## 2025-08-13 DIAGNOSIS — J30.1 SEASONAL ALLERGIC RHINITIS DUE TO POLLEN: ICD-10-CM

## 2025-08-13 DIAGNOSIS — F51.04 CHRONIC INSOMNIA: ICD-10-CM

## 2025-08-13 DIAGNOSIS — I10 BENIGN ESSENTIAL HYPERTENSION: ICD-10-CM

## 2025-08-13 RX ORDER — ZOLPIDEM TARTRATE 5 MG/1
2.5 TABLET ORAL NIGHTLY
Qty: 45 TABLET | Refills: 0 | Status: SHIPPED | OUTPATIENT
Start: 2025-08-13

## 2025-08-13 RX ORDER — MONTELUKAST SODIUM 10 MG/1
10 TABLET ORAL DAILY
Qty: 90 TABLET | Refills: 0 | Status: SHIPPED | OUTPATIENT
Start: 2025-08-13

## 2025-08-13 RX ORDER — AMLODIPINE BESYLATE 10 MG/1
10 TABLET ORAL DAILY
Qty: 90 TABLET | Refills: 3 | Status: SHIPPED | OUTPATIENT
Start: 2025-08-13

## 2025-08-13 RX ORDER — RAMIPRIL 10 MG/1
10 CAPSULE ORAL 2 TIMES DAILY
Qty: 180 CAPSULE | Refills: 0 | Status: SHIPPED | OUTPATIENT
Start: 2025-08-13

## 2025-08-14 ENCOUNTER — APPOINTMENT (OUTPATIENT)
Dept: PULMONOLOGY | Facility: CLINIC | Age: 70
End: 2025-08-14
Payer: MEDICARE

## 2025-11-17 ENCOUNTER — APPOINTMENT (OUTPATIENT)
Dept: PRIMARY CARE | Facility: CLINIC | Age: 70
End: 2025-11-17
Payer: MEDICARE

## 2026-06-04 ENCOUNTER — APPOINTMENT (OUTPATIENT)
Dept: OPHTHALMOLOGY | Facility: CLINIC | Age: 71
End: 2026-06-04
Payer: MEDICARE